# Patient Record
Sex: FEMALE | Race: BLACK OR AFRICAN AMERICAN | NOT HISPANIC OR LATINO | Employment: FULL TIME | URBAN - NONMETROPOLITAN AREA
[De-identification: names, ages, dates, MRNs, and addresses within clinical notes are randomized per-mention and may not be internally consistent; named-entity substitution may affect disease eponyms.]

---

## 2022-12-21 ENCOUNTER — OFFICE VISIT (OUTPATIENT)
Dept: FAMILY MEDICINE CLINIC | Facility: CLINIC | Age: 37
End: 2022-12-21

## 2022-12-21 VITALS
DIASTOLIC BLOOD PRESSURE: 98 MMHG | HEART RATE: 73 BPM | OXYGEN SATURATION: 99 % | WEIGHT: 193.1 LBS | BODY MASS INDEX: 32.97 KG/M2 | SYSTOLIC BLOOD PRESSURE: 134 MMHG | TEMPERATURE: 98.2 F | HEIGHT: 64 IN

## 2022-12-21 DIAGNOSIS — Z00.00 ENCOUNTER FOR MEDICAL EXAMINATION TO ESTABLISH CARE: Primary | ICD-10-CM

## 2022-12-21 PROCEDURE — 99385 PREV VISIT NEW AGE 18-39: CPT | Performed by: NURSE PRACTITIONER

## 2022-12-21 RX ORDER — FLUTICASONE PROPIONATE 50 MCG
SPRAY, SUSPENSION (ML) NASAL
COMMUNITY

## 2022-12-21 RX ORDER — DROSPIRENONE 4 MG/1
TABLET, FILM COATED ORAL
COMMUNITY
Start: 2022-10-17

## 2022-12-21 RX ORDER — MONTELUKAST SODIUM 10 MG/1
TABLET ORAL
COMMUNITY

## 2022-12-21 RX ORDER — CETIRIZINE HYDROCHLORIDE 10 MG/1
TABLET ORAL
COMMUNITY

## 2022-12-21 RX ORDER — MULTIPLE VITAMINS W/ MINERALS TAB 9MG-400MCG
1 TAB ORAL DAILY
COMMUNITY

## 2022-12-21 RX ORDER — MOMETASONE FUROATE AND FORMOTEROL FUMARATE DIHYDRATE 200; 5 UG/1; UG/1
AEROSOL RESPIRATORY (INHALATION)
COMMUNITY

## 2022-12-21 NOTE — PROGRESS NOTES
"Chief Complaint  Asthma and Establish Care    Subjective        Jf Reyes presents to [facility]  History of Present Illness  She has had bw done in May that was relatively normal. She lives here local. She is currently being treated for asthma. She states that this is in good control. Her cholesterol panel and CMP were normal.     Objective   Vital Signs:  /98 (BP Location: Left arm, Patient Position: Sitting, Cuff Size: Adult)   Pulse 73   Temp 98.2 °F (36.8 °C) (Temporal)   Ht 162.6 cm (64\")   Wt 87.6 kg (193 lb 1.6 oz)   SpO2 99%   BMI 33.15 kg/m²   Estimated body mass index is 33.15 kg/m² as calculated from the following:    Height as of this encounter: 162.6 cm (64\").    Weight as of this encounter: 87.6 kg (193 lb 1.6 oz).          Physical Exam  Vitals and nursing note reviewed.   Constitutional:       Appearance: Normal appearance.   HENT:      Head: Normocephalic and atraumatic.      Right Ear: Tympanic membrane and ear canal normal.      Left Ear: Tympanic membrane and ear canal normal.      Nose: Nose normal.      Mouth/Throat:      Pharynx: Oropharynx is clear.   Eyes:      Extraocular Movements: Extraocular movements intact.      Conjunctiva/sclera: Conjunctivae normal.      Pupils: Pupils are equal, round, and reactive to light.   Cardiovascular:      Rate and Rhythm: Normal rate and regular rhythm.      Heart sounds: Normal heart sounds.   Pulmonary:      Effort: Pulmonary effort is normal.      Breath sounds: Normal breath sounds.   Abdominal:      General: Abdomen is flat. Bowel sounds are normal. There is no distension.      Palpations: Abdomen is soft.      Tenderness: There is no abdominal tenderness. There is no guarding or rebound.   Musculoskeletal:         General: Normal range of motion.      Cervical back: Normal range of motion and neck supple.   Skin:     General: Skin is warm and dry.   Neurological:      General: No focal deficit present. "      Mental Status: She is alert and oriented to person, place, and time. Mental status is at baseline.   Psychiatric:         Mood and Affect: Mood normal.         Behavior: Behavior normal.         Thought Content: Thought content normal.         Judgment: Judgment normal.        Result Review :      Data reviewed: Reviewed her bw on her mychart from another clinic.          Assessment and Plan   Diagnoses and all orders for this visit:    1. Encounter for medical examination to establish care (Primary)  Assessment & Plan:  Follow up as needed.             Follow Up   Return if symptoms worsen or fail to improve.  Patient was given instructions and counseling regarding her condition or for health maintenance advice. Please see specific information pulled into the AVS if appropriate.

## 2023-05-24 ENCOUNTER — OFFICE VISIT (OUTPATIENT)
Dept: FAMILY MEDICINE CLINIC | Facility: CLINIC | Age: 38
End: 2023-05-24
Payer: COMMERCIAL

## 2023-05-24 VITALS
DIASTOLIC BLOOD PRESSURE: 98 MMHG | OXYGEN SATURATION: 98 % | HEIGHT: 64 IN | BODY MASS INDEX: 32.88 KG/M2 | HEART RATE: 64 BPM | SYSTOLIC BLOOD PRESSURE: 148 MMHG | WEIGHT: 192.6 LBS

## 2023-05-24 DIAGNOSIS — Z00.00 ENCOUNTER FOR MEDICAL EXAMINATION TO ESTABLISH CARE: Primary | ICD-10-CM

## 2023-05-24 DIAGNOSIS — J30.2 SEASONAL ALLERGIES: ICD-10-CM

## 2023-05-24 PROCEDURE — 99395 PREV VISIT EST AGE 18-39: CPT | Performed by: NURSE PRACTITIONER

## 2023-05-24 PROCEDURE — 36415 COLL VENOUS BLD VENIPUNCTURE: CPT | Performed by: NURSE PRACTITIONER

## 2023-05-24 RX ORDER — ALBUTEROL SULFATE 90 UG/1
AEROSOL, METERED RESPIRATORY (INHALATION)
COMMUNITY

## 2023-05-24 RX ORDER — DROSPIRENONE 4 MG/1
TABLET, FILM COATED ORAL EVERY 24 HOURS
COMMUNITY
End: 2023-05-24

## 2023-05-24 RX ORDER — MONTELUKAST SODIUM 10 MG/1
10 TABLET ORAL NIGHTLY
Qty: 90 TABLET | Refills: 1 | Status: SHIPPED | OUTPATIENT
Start: 2023-05-24

## 2023-05-24 RX ORDER — MOMETASONE FUROATE AND FORMOTEROL FUMARATE DIHYDRATE 200; 5 UG/1; UG/1
2 AEROSOL RESPIRATORY (INHALATION)
Qty: 1 EACH | Refills: 5 | Status: SHIPPED | OUTPATIENT
Start: 2023-05-24

## 2023-05-24 RX ORDER — ALBUTEROL SULFATE 2.5 MG/3ML
SOLUTION RESPIRATORY (INHALATION)
COMMUNITY

## 2023-05-24 NOTE — PROGRESS NOTES
"Chief Complaint  Annual Exam    Subjective        Jf Reyes presents to [facility]  History of Present Illness She is here to day for a physical. She sees GYN for her female issues. She does MSBE. She has breast cancer on her maternal side. She has not had a screening mammogram. She has normal bowels and bladder.     Objective   Vital Signs:  /98 (BP Location: Right arm, Patient Position: Sitting, Cuff Size: Adult)   Pulse 64   Ht 162.6 cm (64\")   Wt 87.4 kg (192 lb 9.6 oz)   SpO2 98%   BMI 33.06 kg/m²   Estimated body mass index is 33.06 kg/m² as calculated from the following:    Height as of this encounter: 162.6 cm (64\").    Weight as of this encounter: 87.4 kg (192 lb 9.6 oz).             Physical Exam  Vitals and nursing note reviewed.   Constitutional:       Appearance: Normal appearance.   HENT:      Head: Normocephalic and atraumatic.      Right Ear: Tympanic membrane and ear canal normal.      Left Ear: Tympanic membrane and ear canal normal.      Nose: Nose normal.      Mouth/Throat:      Pharynx: Oropharynx is clear.   Eyes:      Extraocular Movements: Extraocular movements intact.      Conjunctiva/sclera: Conjunctivae normal.      Pupils: Pupils are equal, round, and reactive to light.   Cardiovascular:      Rate and Rhythm: Normal rate and regular rhythm.      Heart sounds: Normal heart sounds.   Pulmonary:      Effort: Pulmonary effort is normal.      Breath sounds: Normal breath sounds.   Abdominal:      General: Abdomen is flat. Bowel sounds are normal. There is no distension.      Palpations: Abdomen is soft.      Tenderness: There is no abdominal tenderness. There is no guarding or rebound.   Musculoskeletal:         General: Normal range of motion.      Cervical back: Normal range of motion and neck supple.   Skin:     General: Skin is warm and dry.   Neurological:      General: No focal deficit present.      Mental Status: She is alert and oriented to " person, place, and time. Mental status is at baseline.   Psychiatric:         Mood and Affect: Mood normal.         Behavior: Behavior normal.         Thought Content: Thought content normal.         Judgment: Judgment normal.        Result Review :                   Assessment and Plan   Diagnoses and all orders for this visit:    1. Encounter for medical examination to establish care (Primary)  Assessment & Plan:  She is doing msbe and watching her diet.     Orders:  -     Mammo Screening Bilateral With CAD; Future  -     CBC (No Diff); Future  -     Comprehensive metabolic panel; Future  -     Lipid panel; Future  -     TSH; Future  -     Hemoglobin A1c; Future    2. Seasonal allergies  Assessment & Plan:  Refill her medications.     Orders:  -     mometasone-formoterol (Dulera) 200-5 MCG/ACT inhaler; Inhale 2 puffs 2 (Two) Times a Day.  Dispense: 1 each; Refill: 5  -     montelukast (SINGULAIR) 10 MG tablet; Take 1 tablet by mouth Every Night.  Dispense: 90 tablet; Refill: 1           Follow Up   Return in about 1 year (around 5/24/2024) for Annual physical.  Patient was given instructions and counseling regarding her condition or for health maintenance advice. Please see specific information pulled into the AVS if appropriate.

## 2023-05-25 LAB
ALBUMIN SERPL-MCNC: 4.6 G/DL (ref 3.8–4.8)
ALBUMIN/GLOB SERPL: 1.5 {RATIO} (ref 1.2–2.2)
ALP SERPL-CCNC: 88 IU/L (ref 44–121)
ALT SERPL-CCNC: 12 IU/L (ref 0–32)
AST SERPL-CCNC: 14 IU/L (ref 0–40)
BILIRUB SERPL-MCNC: 0.4 MG/DL (ref 0–1.2)
BUN SERPL-MCNC: 6 MG/DL (ref 6–20)
BUN/CREAT SERPL: 9 (ref 9–23)
CALCIUM SERPL-MCNC: 9.4 MG/DL (ref 8.7–10.2)
CHLORIDE SERPL-SCNC: 102 MMOL/L (ref 96–106)
CHOLEST SERPL-MCNC: 187 MG/DL (ref 100–199)
CO2 SERPL-SCNC: 22 MMOL/L (ref 20–29)
CREAT SERPL-MCNC: 0.67 MG/DL (ref 0.57–1)
EGFRCR SERPLBLD CKD-EPI 2021: 116 ML/MIN/1.73
ERYTHROCYTE [DISTWIDTH] IN BLOOD BY AUTOMATED COUNT: 12.3 % (ref 11.7–15.4)
GLOBULIN SER CALC-MCNC: 3 G/DL (ref 1.5–4.5)
GLUCOSE SERPL-MCNC: 78 MG/DL (ref 70–99)
HBA1C MFR BLD: 5.3 % (ref 4.8–5.6)
HCT VFR BLD AUTO: 40.4 % (ref 34–46.6)
HDLC SERPL-MCNC: 59 MG/DL
HGB BLD-MCNC: 13.5 G/DL (ref 11.1–15.9)
LDLC SERPL CALC-MCNC: 115 MG/DL (ref 0–99)
MCH RBC QN AUTO: 30.1 PG (ref 26.6–33)
MCHC RBC AUTO-ENTMCNC: 33.4 G/DL (ref 31.5–35.7)
MCV RBC AUTO: 90 FL (ref 79–97)
PLATELET # BLD AUTO: 296 X10E3/UL (ref 150–450)
POTASSIUM SERPL-SCNC: 4.1 MMOL/L (ref 3.5–5.2)
PROT SERPL-MCNC: 7.6 G/DL (ref 6–8.5)
RBC # BLD AUTO: 4.49 X10E6/UL (ref 3.77–5.28)
SODIUM SERPL-SCNC: 141 MMOL/L (ref 134–144)
TRIGL SERPL-MCNC: 70 MG/DL (ref 0–149)
TSH SERPL DL<=0.005 MIU/L-ACNC: 3.12 UIU/ML (ref 0.45–4.5)
VLDLC SERPL CALC-MCNC: 13 MG/DL (ref 5–40)
WBC # BLD AUTO: 6.4 X10E3/UL (ref 3.4–10.8)

## 2023-11-27 DIAGNOSIS — J30.2 SEASONAL ALLERGIES: ICD-10-CM

## 2023-11-27 RX ORDER — MONTELUKAST SODIUM 10 MG/1
10 TABLET ORAL
Qty: 90 TABLET | Refills: 0 | Status: SHIPPED | OUTPATIENT
Start: 2023-11-27

## 2024-01-09 ENCOUNTER — OFFICE VISIT (OUTPATIENT)
Dept: FAMILY MEDICINE CLINIC | Facility: CLINIC | Age: 39
End: 2024-01-09
Payer: COMMERCIAL

## 2024-01-09 VITALS
OXYGEN SATURATION: 100 % | TEMPERATURE: 98.1 F | DIASTOLIC BLOOD PRESSURE: 90 MMHG | HEART RATE: 69 BPM | HEIGHT: 64 IN | WEIGHT: 196.4 LBS | BODY MASS INDEX: 33.53 KG/M2 | SYSTOLIC BLOOD PRESSURE: 130 MMHG

## 2024-01-09 DIAGNOSIS — R59.9 LYMPH NODE ENLARGEMENT: Primary | ICD-10-CM

## 2024-01-09 DIAGNOSIS — J30.2 SEASONAL ALLERGIES: ICD-10-CM

## 2024-01-09 NOTE — ASSESSMENT & PLAN NOTE
Patient complains of sinus congestion for the past couple of weeks.  Patient has been taking decongestants including Zyrtec  Encourage patient to continue with Zyrtec and decongestants and cautioned her to make sure she is drinking enough fluids during the day.  Patient verbalizes understanding  No change in treatment plan today

## 2024-01-09 NOTE — PATIENT INSTRUCTIONS
Daily Claritin/Zyrtec for congestion  Re-check in 2 weeks - evaluate lymph node and review lab results      Health Maintenance, Female  Adopting a healthy lifestyle and getting preventive care can go a long way to promote health and wellness. Talk with your health care provider about what schedule of regular examinations is right for you. This is a good chance for you to check in with your provider about disease prevention and staying healthy.  In between checkups, there are plenty of things you can do on your own. Experts have done a lot of research about which lifestyle changes and preventive measures are most likely to keep you healthy. Ask your health care provider for more information.  Weight and diet  Eat a healthy diet  Be sure to include plenty of vegetables, fruits, low-fat dairy products, and lean protein.  Do not eat a lot of foods high in solid fats, added sugars, or salt.  Get regular exercise. This is one of the most important things you can do for your health.  Most adults should exercise for at least 150 minutes each week. The exercise should increase your heart rate and make you sweat (moderate-intensity exercise).  Most adults should also do strengthening exercises at least twice a week. This is in addition to the moderate-intensity exercise.     Maintain a healthy weight  Body mass index (BMI) is a measurement that can be used to identify possible weight problems. It estimates body fat based on height and weight. Your health care provider can help determine your BMI and help you achieve or maintain a healthy weight.  For females 20 years of age and older:  A BMI below 18.5 is considered underweight.  A BMI of 18.5 to 24.9 is normal.  A BMI of 25 to 29.9 is considered overweight.  A BMI of 30 and above is considered obese.     Watch levels of cholesterol and blood lipids  You should start having your blood tested for lipids and cholesterol at 20 years of age, then have this test every 5  years.  You may need to have your cholesterol levels checked more often if:  Your lipid or cholesterol levels are high.  You are older than 50 years of age.  You are at high risk for heart disease.     Cancer screening  Lung Cancer  Lung cancer screening is recommended for adults 55-80 years old who are at high risk for lung cancer because of a history of smoking.  A yearly low-dose CT scan of the lungs is recommended for people who:  Currently smoke.  Have quit within the past 15 years.  Have at least a 30-pack-year history of smoking. A pack year is smoking an average of one pack of cigarettes a day for 1 year.  Yearly screening should continue until it has been 15 years since you quit.  Yearly screening should stop if you develop a health problem that would prevent you from having lung cancer treatment.     Breast Cancer  Practice breast self-awareness. This means understanding how your breasts normally appear and feel.  It also means doing regular breast self-exams. Let your health care provider know about any changes, no matter how small.  If you are in your 20s or 30s, you should have a clinical breast exam (CBE) by a health care provider every 1-3 years as part of a regular health exam.  If you are 40 or older, have a CBE every year. Also consider having a breast X-ray (mammogram) every year.  If you have a family history of breast cancer, talk to your health care provider about genetic screening.  If you are at high risk for breast cancer, talk to your health care provider about having an MRI and a mammogram every year.  Breast cancer gene (BRCA) assessment is recommended for women who have family members with BRCA-related cancers. BRCA-related cancers include:  Breast.  Ovarian.  Tubal.  Peritoneal cancers.  Results of the assessment will determine the need for genetic counseling and BRCA1 and BRCA2 testing.     Cervical Cancer  Your health care provider may recommend that you be screened regularly for  cancer of the pelvic organs (ovaries, uterus, and vagina). This screening involves a pelvic examination, including checking for microscopic changes to the surface of your cervix (Pap test). You may be encouraged to have this screening done every 3 years, beginning at age 21.  For women ages 30-65, health care providers may recommend pelvic exams and Pap testing every 3 years, or they may recommend the Pap and pelvic exam, combined with testing for human papilloma virus (HPV), every 5 years. Some types of HPV increase your risk of cervical cancer. Testing for HPV may also be done on women of any age with unclear Pap test results.  Other health care providers may not recommend any screening for nonpregnant women who are considered low risk for pelvic cancer and who do not have symptoms. Ask your health care provider if a screening pelvic exam is right for you.  If you have had past treatment for cervical cancer or a condition that could lead to cancer, you need Pap tests and screening for cancer for at least 20 years after your treatment. If Pap tests have been discontinued, your risk factors (such as having a new sexual partner) need to be reassessed to determine if screening should resume. Some women have medical problems that increase the chance of getting cervical cancer. In these cases, your health care provider may recommend more frequent screening and Pap tests.     Colorectal Cancer  This type of cancer can be detected and often prevented.  Routine colorectal cancer screening usually begins at 50 years of age and continues through 75 years of age.  Your health care provider may recommend screening at an earlier age if you have risk factors for colon cancer.  Your health care provider may also recommend using home test kits to check for hidden blood in the stool.  A small camera at the end of a tube can be used to examine your colon directly (sigmoidoscopy or colonoscopy). This is done to check for the earliest  forms of colorectal cancer.  Routine screening usually begins at age 50.  Direct examination of the colon should be repeated every 5-10 years through 75 years of age. However, you may need to be screened more often if early forms of precancerous polyps or small growths are found.     Skin Cancer  Check your skin from head to toe regularly.  Tell your health care provider about any new moles or changes in moles, especially if there is a change in a mole's shape or color.  Also tell your health care provider if you have a mole that is larger than the size of a pencil eraser.  Always use sunscreen. Apply sunscreen liberally and repeatedly throughout the day.  Protect yourself by wearing long sleeves, pants, a wide-brimmed hat, and sunglasses whenever you are outside.     Heart disease, diabetes, and high blood pressure  High blood pressure causes heart disease and increases the risk of stroke. High blood pressure is more likely to develop in:  People who have blood pressure in the high end of the normal range (130-139/85-89 mm Hg).  People who are overweight or obese.  People who are .  If you are 18-39 years of age, have your blood pressure checked every 3-5 years. If you are 40 years of age or older, have your blood pressure checked every year. You should have your blood pressure measured twice--once when you are at a hospital or clinic, and once when you are not at a hospital or clinic. Record the average of the two measurements. To check your blood pressure when you are not at a hospital or clinic, you can use:  An automated blood pressure machine at a pharmacy.  A home blood pressure monitor.  If you are between 55 years and 79 years old, ask your health care provider if you should take aspirin to prevent strokes.  Have regular diabetes screenings. This involves taking a blood sample to check your fasting blood sugar level.  If you are at a normal weight and have a low risk for diabetes, have this  test once every three years after 45 years of age.  If you are overweight and have a high risk for diabetes, consider being tested at a younger age or more often.  Preventing infection  Hepatitis B  If you have a higher risk for hepatitis B, you should be screened for this virus. You are considered at high risk for hepatitis B if:  You were born in a country where hepatitis B is common. Ask your health care provider which countries are considered high risk.  Your parents were born in a high-risk country, and you have not been immunized against hepatitis B (hepatitis B vaccine).  You have HIV or AIDS.  You use needles to inject street drugs.  You live with someone who has hepatitis B.  You have had sex with someone who has hepatitis B.  You get hemodialysis treatment.  You take certain medicines for conditions, including cancer, organ transplantation, and autoimmune conditions.     Hepatitis C  Blood testing is recommended for:  Everyone born from 1945 through 1965.  Anyone with known risk factors for hepatitis C.     Sexually transmitted infections (STIs)  You should be screened for sexually transmitted infections (STIs) including gonorrhea and chlamydia if:  You are sexually active and are younger than 24 years of age.  You are older than 24 years of age and your health care provider tells you that you are at risk for this type of infection.  Your sexual activity has changed since you were last screened and you are at an increased risk for chlamydia or gonorrhea. Ask your health care provider if you are at risk.  If you do not have HIV, but are at risk, it may be recommended that you take a prescription medicine daily to prevent HIV infection. This is called pre-exposure prophylaxis (PrEP). You are considered at risk if:  You are sexually active and do not regularly use condoms or know the HIV status of your partner(s).  You take drugs by injection.  You are sexually active with a partner who has HIV.     Talk with  your health care provider about whether you are at high risk of being infected with HIV. If you choose to begin PrEP, you should first be tested for HIV. You should then be tested every 3 months for as long as you are taking PrEP.  Pregnancy  If you are premenopausal and you may become pregnant, ask your health care provider about preconception counseling.  If you may become pregnant, take 400 to 800 micrograms (mcg) of folic acid every day.  If you want to prevent pregnancy, talk to your health care provider about birth control (contraception).  Osteoporosis and menopause  Osteoporosis is a disease in which the bones lose minerals and strength with aging. This can result in serious bone fractures. Your risk for osteoporosis can be identified using a bone density scan.  If you are 65 years of age or older, or if you are at risk for osteoporosis and fractures, ask your health care provider if you should be screened.  Ask your health care provider whether you should take a calcium or vitamin D supplement to lower your risk for osteoporosis.  Menopause may have certain physical symptoms and risks.  Hormone replacement therapy may reduce some of these symptoms and risks.  Talk to your health care provider about whether hormone replacement therapy is right for you.  Follow these instructions at home:  Schedule regular health, dental, and eye exams.  Stay current with your immunizations.  Do not use any tobacco products including cigarettes, chewing tobacco, or electronic cigarettes.  If you are pregnant, do not drink alcohol.  If you are breastfeeding, limit how much and how often you drink alcohol.  Limit alcohol intake to no more than 1 drink per day for nonpregnant women. One drink equals 12 ounces of beer, 5 ounces of wine, or 1½ ounces of hard liquor.  Do not use street drugs.  Do not share needles.  Ask your health care provider for help if you need support or information about quitting drugs.  Tell your health  care provider if you often feel depressed.  Tell your health care provider if you have ever been abused or do not feel safe at home.  This information is not intended to replace advice given to you by your health care provider. Make sure you discuss any questions you have with your health care provider.  Document Released: 07/02/2012 Document Revised: 05/25/2017 Document Reviewed: 09/20/2016  [employer] Interactive Patient Education © 2018 [employer]

## 2024-01-09 NOTE — ASSESSMENT & PLAN NOTE
Enlargement of single axillary lymph node, less than 1 cm, nontender, mobile and soft.  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  Patient states lymph node is decreasing in size since she first noticed it.  CBC and CMP collected today and patient agrees to recheck in 2 weeks or return sooner if new or worsening symptoms arise.  Patient education materials attached to AVS. Materials were reviewed with patient during their office visit today and all questions addressed.

## 2024-01-09 NOTE — PROGRESS NOTES
Acute Office Visit      Date: 2024  Patient Name: Jf Reyes  : 1985   MRN: [id number]     Chief Complaint:    Chief Complaint   Patient presents with    Adenopathy       History of Present Illness: Jf Reyes is a 38 y.o. female who is here today for concerns about lymph node swelling in right axillary.    Answers submitted by the patient for this visit:  Office Visit on 2024  8:30 AM with Deena Valle (Submitted on 2024)  Please describe your symptoms.: Right axillary lymph node slightly enlarged  Have you had these symptoms before?: No  How long have you been having these symptoms?: 5-7 days  Please describe any probable cause for these symptoms. 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  Mammogram ordered by another provider at previous visit in May-but because patient is not yet 41 years old this was denied by insurance  Maternal grandmother - breast CA -no other breast cancer history  No known injury or illness, no fevers - recent URI resolving, it began 2 weeks ago, still has some residual congestion   No other constitutional symptoms  No unexpected weight loss - urine and stools normal     Subjective     Medications:     Current Outpatient Medications:     albuterol (PROVENTIL) (2.5 MG/3ML) 0.083% nebulizer solution, albuterol sulfate 2.5 mg/3 mL (0.083 %) solution for nebulization  USE 3 ML VIA NEBULIZER THREE TIMES DAILY, Disp: , Rfl:     albuterol sulfate  (90 Base) MCG/ACT inhaler, albuterol sulfate HFA 90 mcg/actuation aerosol inhaler  INHALE 1 TO 2 PUFFS EVERY 4 TO 6 HOURS AS NEEDED FOR WHEEZE, Disp: , Rfl:     CBD oil (cannabidiol) capsule, Take  by mouth., Disp: , Rfl:     cetirizine (zyrTEC) 10 MG tablet, , Disp: , Rfl:     fluticasone (FLONASE) 50 MCG/ACT nasal spray, , Disp: , Rfl:      "mometasone-formoterol (Dulera) 200-5 MCG/ACT inhaler, Inhale 2 puffs 2 (Two) Times a Day. (Patient taking differently: Inhale 1 puff Daily.), Disp: 1 each, Rfl: 5    montelukast (SINGULAIR) 10 MG tablet, TAKE 1 TABLET BY MOUTH EVERY DAY AT NIGHT, Disp: 90 tablet, Rfl: 0    multivitamin with minerals tablet tablet, Take 1 tablet by mouth Daily., Disp: , Rfl:     Allergies:   Allergies   Allergen Reactions    Ibuprofen Swelling       Objective     Vitals:    01/09/24 0812   BP: 130/90   BP Location: Right arm   Patient Position: Sitting   Cuff Size: Large Adult   Pulse: 69   Temp: 98.1 °F (36.7 °C)   TempSrc: Oral   SpO2: 100%   Weight: 89.1 kg (196 lb 6.4 oz)   Height: 162.6 cm (64\")   PainSc: 0-No pain       Body mass index is 33.71 kg/m².     Physical Exam  Vitals and nursing note reviewed.   Constitutional:       General: She is not in acute distress.     Appearance: Normal appearance. She is not ill-appearing.   HENT:      Head: Normocephalic and atraumatic.      Right Ear: Hearing, ear canal and external ear normal. Tympanic membrane is bulging. Tympanic membrane is not erythematous.      Left Ear: Hearing, ear canal and external ear normal. Tympanic membrane is bulging. Tympanic membrane is not erythematous.      Nose: Congestion and rhinorrhea present.      Mouth/Throat:      Pharynx: Posterior oropharyngeal erythema present. No oropharyngeal exudate.   Eyes:      General: Lids are normal.      Conjunctiva/sclera: Conjunctivae normal.      Pupils: Pupils are equal, round, and reactive to light.   Neck:      Thyroid: No thyroid mass, thyromegaly or thyroid tenderness.      Trachea: Trachea and phonation normal.   Cardiovascular:      Rate and Rhythm: Normal rate and regular rhythm.   Pulmonary:      Effort: Pulmonary effort is normal. No respiratory distress.      Breath sounds: Normal breath sounds.   Musculoskeletal:      Cervical back: Normal range of motion and neck supple. No edema or rigidity. "   Lymphadenopathy:      Head:      Right side of head: No submental, submandibular, tonsillar, preauricular or posterior auricular adenopathy.      Left side of head: No submental, submandibular, tonsillar, preauricular or posterior auricular adenopathy.      Cervical: No cervical adenopathy.      Right cervical: No superficial, deep or posterior cervical adenopathy.     Left cervical: No superficial, deep or posterior cervical adenopathy.      Upper Body:      Right upper body: Axillary adenopathy (Solitary lymph node swelling, right axilla.  Nontender, non-erythematous, soft and mobile) present. No supraclavicular adenopathy.      Left upper body: No supraclavicular or axillary adenopathy.   Skin:     General: Skin is warm and dry.   Neurological:      Mental Status: She is alert and oriented to person, place, and time.   Psychiatric:         Mood and Affect: Mood normal.         Behavior: Behavior normal.         Results for orders placed or performed in visit on 05/24/23   TSH    Blood   Result Value Ref Range    TSH 3.120 0.450 - 4.500 uIU/mL   Comprehensive Metabolic Panel    Blood   Result Value Ref Range    Glucose 78 70 - 99 mg/dL    BUN 6 6 - 20 mg/dL    Creatinine 0.67 0.57 - 1.00 mg/dL    EGFR Result 116 >59 mL/min/1.73    BUN/Creatinine Ratio 9 9 - 23    Sodium 141 134 - 144 mmol/L    Potassium 4.1 3.5 - 5.2 mmol/L    Chloride 102 96 - 106 mmol/L    Total CO2 22 20 - 29 mmol/L    Calcium 9.4 8.7 - 10.2 mg/dL    Total Protein 7.6 6.0 - 8.5 g/dL    Albumin 4.6 3.8 - 4.8 g/dL    Globulin 3.0 1.5 - 4.5 g/dL    A/G Ratio 1.5 1.2 - 2.2    Total Bilirubin 0.4 0.0 - 1.2 mg/dL    Alkaline Phosphatase 88 44 - 121 IU/L    AST (SGOT) 14 0 - 40 IU/L    ALT (SGPT) 12 0 - 32 IU/L   Lipid Panel    Blood   Result Value Ref Range    Total Cholesterol 187 100 - 199 mg/dL    Triglycerides 70 0 - 149 mg/dL    HDL Cholesterol 59 >39 mg/dL    VLDL Cholesterol Patrick 13 5 - 40 mg/dL    LDL Chol Calc (NIH) 115 (H) 0 - 99 mg/dL    CBC (No Diff)    Blood   Result Value Ref Range    WBC 6.4 3.4 - 10.8 x10E3/uL    RBC 4.49 3.77 - 5.28 x10E6/uL    Hemoglobin 13.5 11.1 - 15.9 g/dL    Hematocrit 40.4 34.0 - 46.6 %    MCV 90 79 - 97 fL    MCH 30.1 26.6 - 33.0 pg    MCHC 33.4 31.5 - 35.7 g/dL    RDW 12.3 11.7 - 15.4 %    Platelets 296 150 - 450 x10E3/uL   Hemoglobin A1c    Blood   Result Value Ref Range    Hemoglobin A1C 5.3 4.8 - 5.6 %             Assessment / Plan      Assessment/Plan:   Diagnoses and all orders for this visit:    1. Lymph node enlargement (Primary)  Assessment & Plan:  Enlargement of single axillary lymph node, less than 1 cm, nontender, mobile and soft.  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  Patient states lymph node is decreasing in size since she first noticed it.  CBC and CMP collected today and patient agrees to recheck in 2 weeks or return sooner if new or worsening symptoms arise.  Patient education materials attached to AVS. Materials were reviewed with patient during their office visit today and all questions addressed.    Orders:  -     CBC Auto Differential  -     Comprehensive Metabolic Panel    2. Seasonal allergies  Assessment & Plan:  Patient complains of sinus congestion for the past couple of weeks.  Patient has been taking decongestants including Zyrtec  Encourage patient to continue with Zyrtec and decongestants and cautioned her to make sure she is drinking enough fluids during the day.  Patient verbalizes understanding  No change in treatment plan today      Follow Up:   Return in about 2 weeks (around 1/23/2024) for Recheck.    BILL Bustos (Libby)  [facility] [address] [address]

## 2024-01-10 LAB
ALBUMIN SERPL-MCNC: 4.6 G/DL (ref 3.9–4.9)
ALBUMIN/GLOB SERPL: 1.6 {RATIO} (ref 1.2–2.2)
ALP SERPL-CCNC: 84 IU/L (ref 44–121)
ALT SERPL-CCNC: 12 IU/L (ref 0–32)
AST SERPL-CCNC: 17 IU/L (ref 0–40)
BASOPHILS # BLD AUTO: 0 X10E3/UL (ref 0–0.2)
BASOPHILS NFR BLD AUTO: 0 %
BILIRUB SERPL-MCNC: 0.3 MG/DL (ref 0–1.2)
BUN SERPL-MCNC: 9 MG/DL (ref 6–20)
BUN/CREAT SERPL: 12 (ref 9–23)
CALCIUM SERPL-MCNC: 9.4 MG/DL (ref 8.7–10.2)
CHLORIDE SERPL-SCNC: 103 MMOL/L (ref 96–106)
CO2 SERPL-SCNC: 22 MMOL/L (ref 20–29)
CREAT SERPL-MCNC: 0.77 MG/DL (ref 0.57–1)
EGFRCR SERPLBLD CKD-EPI 2021: 102 ML/MIN/1.73
EOSINOPHIL # BLD AUTO: 0.1 X10E3/UL (ref 0–0.4)
EOSINOPHIL NFR BLD AUTO: 1 %
ERYTHROCYTE [DISTWIDTH] IN BLOOD BY AUTOMATED COUNT: 12 % (ref 11.7–15.4)
GLOBULIN SER CALC-MCNC: 2.8 G/DL (ref 1.5–4.5)
GLUCOSE SERPL-MCNC: 85 MG/DL (ref 70–99)
HCT VFR BLD AUTO: 38.8 % (ref 34–46.6)
HGB BLD-MCNC: 13.1 G/DL (ref 11.1–15.9)
IMM GRANULOCYTES # BLD AUTO: 0 X10E3/UL (ref 0–0.1)
IMM GRANULOCYTES NFR BLD AUTO: 0 %
LYMPHOCYTES # BLD AUTO: 1.8 X10E3/UL (ref 0.7–3.1)
LYMPHOCYTES NFR BLD AUTO: 23 %
MCH RBC QN AUTO: 30.3 PG (ref 26.6–33)
MCHC RBC AUTO-ENTMCNC: 33.8 G/DL (ref 31.5–35.7)
MCV RBC AUTO: 90 FL (ref 79–97)
MONOCYTES # BLD AUTO: 0.6 X10E3/UL (ref 0.1–0.9)
MONOCYTES NFR BLD AUTO: 7 %
NEUTROPHILS # BLD AUTO: 5.2 X10E3/UL (ref 1.4–7)
NEUTROPHILS NFR BLD AUTO: 69 %
PLATELET # BLD AUTO: 314 X10E3/UL (ref 150–450)
POTASSIUM SERPL-SCNC: 4.1 MMOL/L (ref 3.5–5.2)
PROT SERPL-MCNC: 7.4 G/DL (ref 6–8.5)
RBC # BLD AUTO: 4.32 X10E6/UL (ref 3.77–5.28)
SODIUM SERPL-SCNC: 140 MMOL/L (ref 134–144)
WBC # BLD AUTO: 7.7 X10E3/UL (ref 3.4–10.8)

## 2024-01-23 ENCOUNTER — OFFICE VISIT (OUTPATIENT)
Dept: FAMILY MEDICINE CLINIC | Facility: CLINIC | Age: 39
End: 2024-01-23
Payer: COMMERCIAL

## 2024-01-23 VITALS
WEIGHT: 194.3 LBS | BODY MASS INDEX: 33.17 KG/M2 | DIASTOLIC BLOOD PRESSURE: 98 MMHG | HEART RATE: 63 BPM | SYSTOLIC BLOOD PRESSURE: 130 MMHG | HEIGHT: 64 IN | OXYGEN SATURATION: 98 %

## 2024-01-23 DIAGNOSIS — R59.9 LYMPH NODE ENLARGEMENT: ICD-10-CM

## 2024-01-23 DIAGNOSIS — J30.2 SEASONAL ALLERGIES: Primary | ICD-10-CM

## 2024-01-23 DIAGNOSIS — R03.0 ELEVATED BLOOD-PRESSURE READING WITHOUT DIAGNOSIS OF HYPERTENSION: ICD-10-CM

## 2024-01-23 PROCEDURE — 99213 OFFICE O/P EST LOW 20 MIN: CPT

## 2024-01-23 NOTE — ASSESSMENT & PLAN NOTE
Elevated blood pressures over the last several visits-patient did mention she has been taking Sudafed regularly  Patient to stop Sudafed and track her blood pressures over the next several weeks and follow-up for evaluation to determine if pharmacological therapy is indicated   Patient education materials attached to AVS. Materials were reviewed with patient during her office visit today and all questions addressed.

## 2024-01-23 NOTE — ASSESSMENT & PLAN NOTE
Right lymph node swelling almost completely resolved  Patient has no other concerns at this time  Will evaluate at routine follow-up visits and if swelling returns we will consider ultrasound at that time  Patient tells me she was denied screening mammogram previously due to her age, but patient concerned about breast cancer related to maternal grandmother having breast cancer.  May consider BRCA testing.  Will discuss at her follow-up visit in 4 weeks.

## 2024-01-23 NOTE — PROGRESS NOTES
"     Follow Up Office Visit      Date:  2024   Patient Name: Jf Reyes  : 1985   MRN: [id number]     Chief Complaint:    Chief Complaint   Patient presents with    Follow-up       History of Present Illness: Jf Reyes is a 38 y.o. female who is here for follow-up related to enlarged lymph node in right axilla.      HPI Notes:  Swelling in lymph node almost completely resolved, patient denies any other symptoms  Patient curious if could be related to right shoulder dislocation - reduced in ER (in her 20's)   Feels like tissue is more dense in that area  Trans-vag US 2 yrs ago - periods \"not normal\" - nothing abnormal  [facility]ing with [facility] (needs to est here)  BRCA testing? - pt maternal GM breast CA - unknown if she passed from that  Patient has had tubal ligation -recently treated with progesterone when she began having 2 periods a month  Patient reports her periods are now regular-denies ever having abnormal Pap      Subjective      Past Medical History:   Past Medical History:   Diagnosis Date    Allergic     Asthma        Past Surgical History:   Past Surgical History:   Procedure Laterality Date    TUBAL ABDOMINAL LIGATION         Family History:   Family History   Problem Relation Age of Onset    Diabetes Mother     Cancer Father     Cancer Maternal Grandmother        Social History:   Social History     Socioeconomic History    Marital status: Single   Tobacco Use    Smoking status: Never     Passive exposure: Never    Smokeless tobacco: Never   Vaping Use    Vaping Use: Never used   Substance and Sexual Activity    Alcohol use: Yes     Comment: Social    Drug use: Never    Sexual activity: Yes     Partners: Female       Medications:     Current Outpatient Medications:     albuterol (PROVENTIL) (2.5 MG/3ML) 0.083% nebulizer solution, albuterol sulfate 2.5 mg/3 mL (0.083 %) solution for nebulization  USE 3 ML VIA NEBULIZER THREE TIMES DAILY, Disp: , Rfl: " "    albuterol sulfate  (90 Base) MCG/ACT inhaler, albuterol sulfate HFA 90 mcg/actuation aerosol inhaler  INHALE 1 TO 2 PUFFS EVERY 4 TO 6 HOURS AS NEEDED FOR WHEEZE, Disp: , Rfl:     CBD oil (cannabidiol) capsule, Take  by mouth., Disp: , Rfl:     cetirizine (zyrTEC) 10 MG tablet, , Disp: , Rfl:     fluticasone (FLONASE) 50 MCG/ACT nasal spray, , Disp: , Rfl:     mometasone-formoterol (Dulera) 200-5 MCG/ACT inhaler, Inhale 2 puffs 2 (Two) Times a Day. (Patient taking differently: Inhale 1 puff Daily.), Disp: 1 each, Rfl: 5    montelukast (SINGULAIR) 10 MG tablet, TAKE 1 TABLET BY MOUTH EVERY DAY AT NIGHT, Disp: 90 tablet, Rfl: 0    multivitamin with minerals tablet tablet, Take 1 tablet by mouth Daily., Disp: , Rfl:     Allergies:   Allergies   Allergen Reactions    Ibuprofen Swelling       Objective     Physical Exam  Vitals and nursing note reviewed.   Constitutional:       Appearance: Normal appearance.   Eyes:      Pupils: Pupils are equal, round, and reactive to light.   Cardiovascular:      Rate and Rhythm: Normal rate and regular rhythm.   Pulmonary:      Effort: Pulmonary effort is normal.   Lymphadenopathy:      Upper Body:      Right upper body: Axillary adenopathy (Solitary lymph node enlargement, resolving, approx. 0.5 cm in size, soft., mobile, non-tender) present.   Neurological:      Mental Status: She is alert and oriented to person, place, and time.       Vitals:    01/23/24 0804   BP: 130/98   BP Location: Right arm   Patient Position: Sitting   Cuff Size: Adult   Pulse: 63   SpO2: 98%   Weight: 88.1 kg (194 lb 4.8 oz)   Height: 162.6 cm (64\")   PainSc: 0-No pain     Body mass index is 33.35 kg/m².   BMI is >= 30 and <35. (Class 1 Obesity). The following options were offered after discussion;: exercise counseling/recommendations, nutrition counseling/recommendations, information on healthy weight added to patient's after visit summary , and discussed patient barriers to weight " loss.      Assessment / Plan      Assessment/Plan:   Diagnoses and all orders for this visit:    1. Seasonal allergies (Primary)  Assessment & Plan:  Patient to stop Sudafed and go back to Zyrtec and Flonase to control seasonal allergy symptoms if needed  No other change in treatment plan today  Will continue to evaluate at routine follow-up visits      2. Lymph node enlargement  Assessment & Plan:  Right lymph node swelling almost completely resolved  Patient has no other concerns at this time  Will evaluate at routine follow-up visits and if swelling returns we will consider ultrasound at that time  Patient tells me she was denied screening mammogram previously due to her age, but patient concerned about breast cancer related to maternal grandmother having breast cancer.  May consider BRCA testing.  Will discuss at her follow-up visit in 4 weeks.      3. Elevated blood-pressure reading without diagnosis of hypertension  Assessment & Plan:  Elevated blood pressures over the last several visits-patient did mention she has been taking Sudafed regularly  Patient to stop Sudafed and track her blood pressures over the next several weeks and follow-up for evaluation to determine if pharmacological therapy is indicated   Patient education materials attached to AVS. Materials were reviewed with patient during her office visit today and all questions addressed.      Follow Up:   Return in about 4 weeks (around 2/20/2024) for Recheck.    BILL Bustos (Libby)  [facility] [address] [address]

## 2024-01-23 NOTE — ASSESSMENT & PLAN NOTE
Patient to stop Sudafed and go back to Zyrtec and Flonase to control seasonal allergy symptoms if needed  No other change in treatment plan today  Will continue to evaluate at routine follow-up visits

## 2024-02-22 DIAGNOSIS — J30.2 SEASONAL ALLERGIES: ICD-10-CM

## 2024-02-22 RX ORDER — MONTELUKAST SODIUM 10 MG/1
10 TABLET ORAL
Qty: 90 TABLET | Refills: 0 | Status: SHIPPED | OUTPATIENT
Start: 2024-02-22

## 2024-02-28 ENCOUNTER — OFFICE VISIT (OUTPATIENT)
Dept: FAMILY MEDICINE CLINIC | Facility: CLINIC | Age: 39
End: 2024-02-28
Payer: COMMERCIAL

## 2024-02-28 VITALS
SYSTOLIC BLOOD PRESSURE: 130 MMHG | WEIGHT: 194.4 LBS | BODY MASS INDEX: 33.19 KG/M2 | RESPIRATION RATE: 15 BRPM | HEIGHT: 64 IN | OXYGEN SATURATION: 98 % | DIASTOLIC BLOOD PRESSURE: 100 MMHG | HEART RATE: 70 BPM

## 2024-02-28 DIAGNOSIS — J45.20 MILD INTERMITTENT ASTHMA WITHOUT COMPLICATION: ICD-10-CM

## 2024-02-28 DIAGNOSIS — Z00.00 ANNUAL PHYSICAL EXAM: ICD-10-CM

## 2024-02-28 DIAGNOSIS — R03.0 ELEVATED BLOOD-PRESSURE READING WITHOUT DIAGNOSIS OF HYPERTENSION: Primary | ICD-10-CM

## 2024-02-28 DIAGNOSIS — J30.2 SEASONAL ALLERGIES: ICD-10-CM

## 2024-02-28 DIAGNOSIS — R59.9 LYMPH NODE ENLARGEMENT: ICD-10-CM

## 2024-02-28 RX ORDER — MOMETASONE FUROATE AND FORMOTEROL FUMARATE DIHYDRATE 200; 5 UG/1; UG/1
1 AEROSOL RESPIRATORY (INHALATION) DAILY
Qty: 13 G | Refills: 2 | Status: SHIPPED | OUTPATIENT
Start: 2024-02-28

## 2024-02-28 RX ORDER — ALBUTEROL SULFATE 90 UG/1
2 AEROSOL, METERED RESPIRATORY (INHALATION) EVERY 4 HOURS PRN
Qty: 6.7 G | Refills: 2 | Status: SHIPPED | OUTPATIENT
Start: 2024-02-28

## 2024-02-28 NOTE — PATIENT INSTRUCTIONS
Health Maintenance, Female  Adopting a healthy lifestyle and getting preventive care can go a long way to promote health and wellness. Talk with your health care provider about what schedule of regular examinations is right for you. This is a good chance for you to check in with your provider about disease prevention and staying healthy.  In between checkups, there are plenty of things you can do on your own. Experts have done a lot of research about which lifestyle changes and preventive measures are most likely to keep you healthy. Ask your health care provider for more information.  Weight and diet  Eat a healthy diet  Be sure to include plenty of vegetables, fruits, low-fat dairy products, and lean protein.  Do not eat a lot of foods high in solid fats, added sugars, or salt.  Get regular exercise. This is one of the most important things you can do for your health.  Most adults should exercise for at least 150 minutes each week. The exercise should increase your heart rate and make you sweat (moderate-intensity exercise).  Most adults should also do strengthening exercises at least twice a week. This is in addition to the moderate-intensity exercise.     Maintain a healthy weight  Body mass index (BMI) is a measurement that can be used to identify possible weight problems. It estimates body fat based on height and weight. Your health care provider can help determine your BMI and help you achieve or maintain a healthy weight.  For females 20 years of age and older:  A BMI below 18.5 is considered underweight.  A BMI of 18.5 to 24.9 is normal.  A BMI of 25 to 29.9 is considered overweight.  A BMI of 30 and above is considered obese.     Watch levels of cholesterol and blood lipids  You should start having your blood tested for lipids and cholesterol at 20 years of age, then have this test every 5 years.  You may need to have your cholesterol levels checked more often if:  Your lipid or cholesterol levels are  high.  You are older than 50 years of age.  You are at high risk for heart disease.     Cancer screening  Lung Cancer  Lung cancer screening is recommended for adults 55-80 years old who are at high risk for lung cancer because of a history of smoking.  A yearly low-dose CT scan of the lungs is recommended for people who:  Currently smoke.  Have quit within the past 15 years.  Have at least a 30-pack-year history of smoking. A pack year is smoking an average of one pack of cigarettes a day for 1 year.  Yearly screening should continue until it has been 15 years since you quit.  Yearly screening should stop if you develop a health problem that would prevent you from having lung cancer treatment.     Breast Cancer  Practice breast self-awareness. This means understanding how your breasts normally appear and feel.  It also means doing regular breast self-exams. Let your health care provider know about any changes, no matter how small.  If you are in your 20s or 30s, you should have a clinical breast exam (CBE) by a health care provider every 1-3 years as part of a regular health exam.  If you are 40 or older, have a CBE every year. Also consider having a breast X-ray (mammogram) every year.  If you have a family history of breast cancer, talk to your health care provider about genetic screening.  If you are at high risk for breast cancer, talk to your health care provider about having an MRI and a mammogram every year.  Breast cancer gene (BRCA) assessment is recommended for women who have family members with BRCA-related cancers. BRCA-related cancers include:  Breast.  Ovarian.  Tubal.  Peritoneal cancers.  Results of the assessment will determine the need for genetic counseling and BRCA1 and BRCA2 testing.     Cervical Cancer  Your health care provider may recommend that you be screened regularly for cancer of the pelvic organs (ovaries, uterus, and vagina). This screening involves a pelvic examination, including  checking for microscopic changes to the surface of your cervix (Pap test). You may be encouraged to have this screening done every 3 years, beginning at age 21.  For women ages 30-65, health care providers may recommend pelvic exams and Pap testing every 3 years, or they may recommend the Pap and pelvic exam, combined with testing for human papilloma virus (HPV), every 5 years. Some types of HPV increase your risk of cervical cancer. Testing for HPV may also be done on women of any age with unclear Pap test results.  Other health care providers may not recommend any screening for nonpregnant women who are considered low risk for pelvic cancer and who do not have symptoms. Ask your health care provider if a screening pelvic exam is right for you.  If you have had past treatment for cervical cancer or a condition that could lead to cancer, you need Pap tests and screening for cancer for at least 20 years after your treatment. If Pap tests have been discontinued, your risk factors (such as having a new sexual partner) need to be reassessed to determine if screening should resume. Some women have medical problems that increase the chance of getting cervical cancer. In these cases, your health care provider may recommend more frequent screening and Pap tests.     Colorectal Cancer  This type of cancer can be detected and often prevented.  Routine colorectal cancer screening usually begins at 50 years of age and continues through 75 years of age.  Your health care provider may recommend screening at an earlier age if you have risk factors for colon cancer.  Your health care provider may also recommend using home test kits to check for hidden blood in the stool.  A small camera at the end of a tube can be used to examine your colon directly (sigmoidoscopy or colonoscopy). This is done to check for the earliest forms of colorectal cancer.  Routine screening usually begins at age 50.  Direct examination of the colon should  be repeated every 5-10 years through 75 years of age. However, you may need to be screened more often if early forms of precancerous polyps or small growths are found.     Skin Cancer  Check your skin from head to toe regularly.  Tell your health care provider about any new moles or changes in moles, especially if there is a change in a mole's shape or color.  Also tell your health care provider if you have a mole that is larger than the size of a pencil eraser.  Always use sunscreen. Apply sunscreen liberally and repeatedly throughout the day.  Protect yourself by wearing long sleeves, pants, a wide-brimmed hat, and sunglasses whenever you are outside.     Heart disease, diabetes, and high blood pressure  High blood pressure causes heart disease and increases the risk of stroke. High blood pressure is more likely to develop in:  People who have blood pressure in the high end of the normal range (130-139/85-89 mm Hg).  People who are overweight or obese.  People who are .  If you are 18-39 years of age, have your blood pressure checked every 3-5 years. If you are 40 years of age or older, have your blood pressure checked every year. You should have your blood pressure measured twice--once when you are at a hospital or clinic, and once when you are not at a hospital or clinic. Record the average of the two measurements. To check your blood pressure when you are not at a hospital or clinic, you can use:  An automated blood pressure machine at a pharmacy.  A home blood pressure monitor.  If you are between 55 years and 79 years old, ask your health care provider if you should take aspirin to prevent strokes.  Have regular diabetes screenings. This involves taking a blood sample to check your fasting blood sugar level.  If you are at a normal weight and have a low risk for diabetes, have this test once every three years after 45 years of age.  If you are overweight and have a high risk for diabetes,  consider being tested at a younger age or more often.  Preventing infection  Hepatitis B  If you have a higher risk for hepatitis B, you should be screened for this virus. You are considered at high risk for hepatitis B if:  You were born in a country where hepatitis B is common. Ask your health care provider which countries are considered high risk.  Your parents were born in a high-risk country, and you have not been immunized against hepatitis B (hepatitis B vaccine).  You have HIV or AIDS.  You use needles to inject street drugs.  You live with someone who has hepatitis B.  You have had sex with someone who has hepatitis B.  You get hemodialysis treatment.  You take certain medicines for conditions, including cancer, organ transplantation, and autoimmune conditions.     Hepatitis C  Blood testing is recommended for:  Everyone born from 1945 through 1965.  Anyone with known risk factors for hepatitis C.     Sexually transmitted infections (STIs)  You should be screened for sexually transmitted infections (STIs) including gonorrhea and chlamydia if:  You are sexually active and are younger than 24 years of age.  You are older than 24 years of age and your health care provider tells you that you are at risk for this type of infection.  Your sexual activity has changed since you were last screened and you are at an increased risk for chlamydia or gonorrhea. Ask your health care provider if you are at risk.  If you do not have HIV, but are at risk, it may be recommended that you take a prescription medicine daily to prevent HIV infection. This is called pre-exposure prophylaxis (PrEP). You are considered at risk if:  You are sexually active and do not regularly use condoms or know the HIV status of your partner(s).  You take drugs by injection.  You are sexually active with a partner who has HIV.     Talk with your health care provider about whether you are at high risk of being infected with HIV. If you choose to  begin PrEP, you should first be tested for HIV. You should then be tested every 3 months for as long as you are taking PrEP.  Pregnancy  If you are premenopausal and you may become pregnant, ask your health care provider about preconception counseling.  If you may become pregnant, take 400 to 800 micrograms (mcg) of folic acid every day.  If you want to prevent pregnancy, talk to your health care provider about birth control (contraception).  Osteoporosis and menopause  Osteoporosis is a disease in which the bones lose minerals and strength with aging. This can result in serious bone fractures. Your risk for osteoporosis can be identified using a bone density scan.  If you are 65 years of age or older, or if you are at risk for osteoporosis and fractures, ask your health care provider if you should be screened.  Ask your health care provider whether you should take a calcium or vitamin D supplement to lower your risk for osteoporosis.  Menopause may have certain physical symptoms and risks.  Hormone replacement therapy may reduce some of these symptoms and risks.  Talk to your health care provider about whether hormone replacement therapy is right for you.  Follow these instructions at home:  Schedule regular health, dental, and eye exams.  Stay current with your immunizations.  Do not use any tobacco products including cigarettes, chewing tobacco, or electronic cigarettes.  If you are pregnant, do not drink alcohol.  If you are breastfeeding, limit how much and how often you drink alcohol.  Limit alcohol intake to no more than 1 drink per day for nonpregnant women. One drink equals 12 ounces of beer, 5 ounces of wine, or 1½ ounces of hard liquor.  Do not use street drugs.  Do not share needles.  Ask your health care provider for help if you need support or information about quitting drugs.  Tell your health care provider if you often feel depressed.  Tell your health care provider if you have ever been abused or do  not feel safe at home.  This information is not intended to replace advice given to you by your health care provider. Make sure you discuss any questions you have with your health care provider.  Document Released: 07/02/2012 Document Revised: 05/25/2017 Document Reviewed: 09/20/2016  [employer] Patient Education © 2018 [employer]

## 2024-02-28 NOTE — ASSESSMENT & PLAN NOTE
Patient reports adequate control with Zyrtec and Flonase.  Patient continues to abstain from Sudafed due to blood pressure concerns at her last visit.  No change in treatment regimen today, will evaluate at next routine follow-up visit.

## 2024-02-28 NOTE — ASSESSMENT & PLAN NOTE
Patient due to annual wellness exam May 2024  Patient to schedule annual wellness exam and agrees to come in a week prior to have labs drawn so we can review those at her wellness visit.  See future orders

## 2024-02-28 NOTE — PROGRESS NOTES
"     Follow Up Office Visit      Date:  2024   Patient Name: Jf Reyes  : 1985   MRN: [id number]     Chief Complaint:    Chief Complaint   Patient presents with    Hypertension     Follow up       History of Present Illness: Jf Reyes is a 38 y.o. female who is here today for routine follow-up visit to discuss health goals and address additional care gaps.  Patient's blood pressure is elevated again today but she has been keeping a log of her blood pressures at home which show she is consistently below goal and appears to have some level of \"whitecoat\" reaction.  Patient denies constitutional symptoms and has no questions or concerns.      Re-check of blood pressure  Elevated today in the office  Calibrated pt home cuff to ours and they are similar  Home blood pressures below goal  Asthma symtoms well controlled on 1 puff of dulera  Rescue inhaler with her - does not use often -  - sent new order    Subjective      Answers submitted by the patient for this visit:  Office Visit on 2024  8:00 AM with Deena Valle (Submitted on 2024)  Please describe your symptoms.: recheck after blood pressure monitoring  Have you had these symptoms before?: No  How long have you been having these symptoms?: Greater than 2 weeks      Past Medical History:   Past Medical History:   Diagnosis Date    Allergic     Asthma        Past Surgical History:   Past Surgical History:   Procedure Laterality Date    TUBAL ABDOMINAL LIGATION         Family History:   Family History   Problem Relation Age of Onset    Diabetes Mother     Mental illness Mother         Depression, Borderline Personality Disorder    Cancer Father     Hyperlipidemia Father         recently found out    Cancer Maternal Grandmother     Thyroid disease Sister         Thyroid Cancer-surgical removal and treatment       Social History:   Social History     Socioeconomic History    Marital status: Single   Tobacco Use    Smoking " status: Never     Passive exposure: Never    Smokeless tobacco: Never   Vaping Use    Vaping Use: Never used   Substance and Sexual Activity    Alcohol use: Yes     Comment: Average 1-6 drinks a month    Drug use: Never    Sexual activity: Yes     Partners: Male     Birth control/protection: Tubal ligation     Comment: Boyfriend of 4+ years       Medications:     Current Outpatient Medications:     albuterol (PROVENTIL) (2.5 MG/3ML) 0.083% nebulizer solution, albuterol sulfate 2.5 mg/3 mL (0.083 %) solution for nebulization  USE 3 ML VIA NEBULIZER THREE TIMES DAILY, Disp: , Rfl:     albuterol sulfate  (90 Base) MCG/ACT inhaler, Inhale 2 puffs Every 4 (Four) Hours As Needed for Wheezing or Shortness of Air., Disp: 6.7 g, Rfl: 2    CBD oil (cannabidiol) capsule, Take  by mouth., Disp: , Rfl:     cetirizine (zyrTEC) 10 MG tablet, , Disp: , Rfl:     fluticasone (FLONASE) 50 MCG/ACT nasal spray, , Disp: , Rfl:     mometasone-formoterol (Dulera) 200-5 MCG/ACT inhaler, Inhale 1 puff Daily., Disp: 13 g, Rfl: 2    montelukast (SINGULAIR) 10 MG tablet, TAKE 1 TABLET BY MOUTH EVERY DAY AT NIGHT, Disp: 90 tablet, Rfl: 0    multivitamin with minerals tablet tablet, Take 1 tablet by mouth Daily., Disp: , Rfl:     Allergies:   Allergies   Allergen Reactions    Ibuprofen Swelling       Objective     Physical Exam  Vitals and nursing note reviewed.   Constitutional:       General: She is not in acute distress.     Appearance: Normal appearance. She is not toxic-appearing.   HENT:      Head: Normocephalic.      Nose: Nose normal.   Eyes:      Pupils: Pupils are equal, round, and reactive to light.   Cardiovascular:      Rate and Rhythm: Normal rate and regular rhythm.      Heart sounds: No murmur heard.  Pulmonary:      Effort: Pulmonary effort is normal. No respiratory distress.      Breath sounds: Normal breath sounds.   Musculoskeletal:         General: Normal range of motion.      Cervical back: Normal range of motion and  "neck supple.      Right lower leg: No edema.      Left lower leg: No edema.   Skin:     General: Skin is warm and dry.   Neurological:      Mental Status: She is alert and oriented to person, place, and time.   Psychiatric:         Mood and Affect: Mood normal.         Behavior: Behavior normal.       Vitals:    02/28/24 0811   BP: 130/100   BP Location: Left arm   Patient Position: Sitting   Cuff Size: Large Adult   Pulse: 70   Resp: 15   SpO2: 98%   Weight: 88.2 kg (194 lb 6.4 oz)   Height: 162.6 cm (64.02\")   PainSc: 0-No pain     Body mass index is 33.35 kg/m².        Assessment / Plan      Assessment/Plan:   Diagnoses and all orders for this visit:    1. Elevated blood-pressure reading without diagnosis of hypertension (Primary)  Assessment & Plan:  Patient blood pressure elevated today, home blood pressure log shows blood pressures consistently at goal.  No change in treatment regimen  Patient agrees to continue to monitor blood pressures 2-3 times per week at least, agrees to follow-up with the office sooner than next scheduled routine visit if blood pressures consistently greater than 140/90.  Will reassess at next routine follow-up visit.      2. Seasonal allergies  Assessment & Plan:  Patient reports adequate control with Zyrtec and Flonase.  Patient continues to abstain from Sudafed due to blood pressure concerns at her last visit.  No change in treatment regimen today, will evaluate at next routine follow-up visit.    Orders:  -     mometasone-formoterol (Dulera) 200-5 MCG/ACT inhaler; Inhale 1 puff Daily.  Dispense: 13 g; Refill: 2    3. Mild intermittent asthma without complication  Assessment & Plan:  Asthma is stable.  The patient is experiencing no daytime asthma symptoms and she is experiencing no nighttime asthma symptoms.    Plan: Continue same medication/s without change.    Discussed medication dosage, use, side effects, and goals of treatment in detail.    Discussed distinction between " quick-relief and maintenance control medications.  Discussed monitoring symptoms and use of quick-relief medications and contacting provider early in the course of exacerbations.  Warning signs of respiratory distress were reviewed with the patient.     Patient Treatment Goals: symptom prevention, minimizing limitation in activity, prevention of exacerbations and use of ER/inpatient care, maintenance of optimal pulmonary function, and minimization of adverse effects of treatment.    Followup at the next regular appointment.    Orders:  -     mometasone-formoterol (Dulera) 200-5 MCG/ACT inhaler; Inhale 1 puff Daily.  Dispense: 13 g; Refill: 2  -     albuterol sulfate  (90 Base) MCG/ACT inhaler; Inhale 2 puffs Every 4 (Four) Hours As Needed for Wheezing or Shortness of Air.  Dispense: 6.7 g; Refill: 2    4. Lymph node enlargement  Assessment & Plan:  Previous lymphadenopathy right axilla, at last visit was almost completely resolved  Lymph node is still palpable today, nontender, patient to have follow-up visit in May discussed with patient that if lymph node is still palpable at that time we will consider ultrasound evaluation and early mammogram.  No change in treatment regimen, will evaluate at next routine follow-up visit.      5. Annual physical exam  Assessment & Plan:  Patient due to annual wellness exam May 2024  Patient to schedule annual wellness exam and agrees to come in a week prior to have labs drawn so we can review those at her wellness visit.  See future orders    Orders:  -     CBC Auto Differential; Future  -     Comprehensive Metabolic Panel; Future  -     Lipid Panel; Future  -     TSH Rfx On Abnormal To Free T4; Future       Follow Up:   Return in about 3 months (around 5/28/2024).    BILL Bustos (Libby)  [facility] [address] [address]

## 2024-02-28 NOTE — ASSESSMENT & PLAN NOTE
Asthma is stable.  The patient is experiencing no daytime asthma symptoms and she is experiencing no nighttime asthma symptoms.    Plan: Continue same medication/s without change.    Discussed medication dosage, use, side effects, and goals of treatment in detail.    Discussed distinction between quick-relief and maintenance control medications.  Discussed monitoring symptoms and use of quick-relief medications and contacting provider early in the course of exacerbations.  Warning signs of respiratory distress were reviewed with the patient.     Patient Treatment Goals: symptom prevention, minimizing limitation in activity, prevention of exacerbations and use of ER/inpatient care, maintenance of optimal pulmonary function, and minimization of adverse effects of treatment.    Followup at the next regular appointment.

## 2024-02-28 NOTE — ASSESSMENT & PLAN NOTE
Previous lymphadenopathy right axilla, at last visit was almost completely resolved  Lymph node is still palpable today, nontender, patient to have follow-up visit in May discussed with patient that if lymph node is still palpable at that time we will consider ultrasound evaluation and early mammogram.  No change in treatment regimen, will evaluate at next routine follow-up visit.   Quality 130: Documentation Of Current Medications In The Medical Record: Current Medications Documented Quality 110: Preventive Care And Screening: Influenza Immunization: Influenza Immunization previously received during influenza season Quality 47: Advance Care Plan: Advance Care Planning discussed and documented in the medical record; patient did not wish or was not able to name a surrogate decision maker or provide an advance care plan. Detail Level: Detailed Quality 226: Preventive Care And Screening: Tobacco Use: Screening And Cessation Intervention: Patient screened for tobacco use and is an ex/non-smoker

## 2024-05-24 DIAGNOSIS — J30.2 SEASONAL ALLERGIES: ICD-10-CM

## 2024-05-24 RX ORDER — MONTELUKAST SODIUM 10 MG/1
10 TABLET ORAL
Qty: 90 TABLET | Refills: 0 | Status: SHIPPED | OUTPATIENT
Start: 2024-05-24

## 2024-06-12 ENCOUNTER — LAB (OUTPATIENT)
Dept: FAMILY MEDICINE CLINIC | Facility: CLINIC | Age: 39
End: 2024-06-12
Payer: COMMERCIAL

## 2024-06-12 DIAGNOSIS — Z00.00 ANNUAL PHYSICAL EXAM: ICD-10-CM

## 2024-06-12 PROCEDURE — 36415 COLL VENOUS BLD VENIPUNCTURE: CPT

## 2024-06-13 LAB
ALBUMIN SERPL-MCNC: 4.4 G/DL (ref 3.9–4.9)
ALBUMIN/GLOB SERPL: 1.7 {RATIO}
ALP SERPL-CCNC: 77 IU/L (ref 44–121)
ALT SERPL-CCNC: 14 IU/L (ref 0–32)
AST SERPL-CCNC: 18 IU/L (ref 0–40)
BASOPHILS # BLD AUTO: 0 X10E3/UL (ref 0–0.2)
BASOPHILS NFR BLD AUTO: 0 %
BILIRUB SERPL-MCNC: 0.3 MG/DL (ref 0–1.2)
BUN SERPL-MCNC: 10 MG/DL (ref 6–20)
BUN/CREAT SERPL: 14 (ref 9–23)
CALCIUM SERPL-MCNC: 9.1 MG/DL (ref 8.7–10.2)
CHLORIDE SERPL-SCNC: 105 MMOL/L (ref 96–106)
CHOLEST SERPL-MCNC: 181 MG/DL (ref 100–199)
CO2 SERPL-SCNC: 23 MMOL/L (ref 20–29)
CREAT SERPL-MCNC: 0.72 MG/DL (ref 0.57–1)
EGFRCR SERPLBLD CKD-EPI 2021: 110 ML/MIN/1.73
EOSINOPHIL # BLD AUTO: 0.1 X10E3/UL (ref 0–0.4)
EOSINOPHIL NFR BLD AUTO: 2 %
ERYTHROCYTE [DISTWIDTH] IN BLOOD BY AUTOMATED COUNT: 12.3 % (ref 11.7–15.4)
GLOBULIN SER CALC-MCNC: 2.6 G/DL (ref 1.5–4.5)
GLUCOSE SERPL-MCNC: 83 MG/DL (ref 70–99)
HCT VFR BLD AUTO: 40.1 % (ref 34–46.6)
HDLC SERPL-MCNC: 51 MG/DL
HGB BLD-MCNC: 13 G/DL (ref 11.1–15.9)
IMM GRANULOCYTES # BLD AUTO: 0 X10E3/UL (ref 0–0.1)
IMM GRANULOCYTES NFR BLD AUTO: 0 %
LDLC SERPL CALC-MCNC: 118 MG/DL (ref 0–99)
LYMPHOCYTES # BLD AUTO: 1.8 X10E3/UL (ref 0.7–3.1)
LYMPHOCYTES NFR BLD AUTO: 26 %
MCH RBC QN AUTO: 30.1 PG (ref 26.6–33)
MCHC RBC AUTO-ENTMCNC: 32.4 G/DL (ref 31.5–35.7)
MCV RBC AUTO: 93 FL (ref 79–97)
MONOCYTES # BLD AUTO: 0.5 X10E3/UL (ref 0.1–0.9)
MONOCYTES NFR BLD AUTO: 7 %
NEUTROPHILS # BLD AUTO: 4.5 X10E3/UL (ref 1.4–7)
NEUTROPHILS NFR BLD AUTO: 65 %
PLATELET # BLD AUTO: 309 X10E3/UL (ref 150–450)
POTASSIUM SERPL-SCNC: 4.1 MMOL/L (ref 3.5–5.2)
PROT SERPL-MCNC: 7 G/DL (ref 6–8.5)
RBC # BLD AUTO: 4.32 X10E6/UL (ref 3.77–5.28)
SODIUM SERPL-SCNC: 140 MMOL/L (ref 134–144)
TRIGL SERPL-MCNC: 63 MG/DL (ref 0–149)
TSH SERPL DL<=0.005 MIU/L-ACNC: 2.38 UIU/ML (ref 0.45–4.5)
VLDLC SERPL CALC-MCNC: 12 MG/DL (ref 5–40)
WBC # BLD AUTO: 6.9 X10E3/UL (ref 3.4–10.8)

## 2024-06-24 ENCOUNTER — OFFICE VISIT (OUTPATIENT)
Dept: FAMILY MEDICINE CLINIC | Facility: CLINIC | Age: 39
End: 2024-06-24
Payer: COMMERCIAL

## 2024-06-24 VITALS
BODY MASS INDEX: 32.78 KG/M2 | DIASTOLIC BLOOD PRESSURE: 90 MMHG | HEIGHT: 64 IN | HEART RATE: 81 BPM | SYSTOLIC BLOOD PRESSURE: 120 MMHG | RESPIRATION RATE: 18 BRPM | OXYGEN SATURATION: 100 % | WEIGHT: 192 LBS

## 2024-06-24 DIAGNOSIS — J45.20 MILD INTERMITTENT ASTHMA WITHOUT COMPLICATION: ICD-10-CM

## 2024-06-24 DIAGNOSIS — R03.0 ELEVATED BLOOD-PRESSURE READING WITHOUT DIAGNOSIS OF HYPERTENSION: ICD-10-CM

## 2024-06-24 DIAGNOSIS — Z00.00 ANNUAL PHYSICAL EXAM: Primary | ICD-10-CM

## 2024-06-24 DIAGNOSIS — J30.2 SEASONAL ALLERGIES: ICD-10-CM

## 2024-06-24 DIAGNOSIS — R59.9 LYMPH NODE ENLARGEMENT: ICD-10-CM

## 2024-06-24 PROCEDURE — 99214 OFFICE O/P EST MOD 30 MIN: CPT

## 2024-06-24 RX ORDER — MOMETASONE FUROATE AND FORMOTEROL FUMARATE DIHYDRATE 200; 5 UG/1; UG/1
1 AEROSOL RESPIRATORY (INHALATION) DAILY
Qty: 13 G | Refills: 2 | Status: SHIPPED | OUTPATIENT
Start: 2024-06-24

## 2024-06-24 NOTE — ASSESSMENT & PLAN NOTE
Home blood pressure log shows blood pressure well within goal.  No change in treatment plan at this time.  Will reassess at routine follow-up visits unless otherwise indicated.

## 2024-06-24 NOTE — ASSESSMENT & PLAN NOTE
Patient presents today for annual wellness exam with chief complaint of persistent lymph node, right axilla.  Lab results from 6/13/2024 were reviewed during patient's visit today and all questions addressed to patient's satisfaction.  Current medication refills provided per patient request.  Patient education materials attached to AVS related to health maintenance recommendations and healthy lifestyle. Materials were reviewed with patient during their office visit today and all questions addressed.  Patient agrees to follow-up in 6 months, unless otherwise indicated.

## 2024-06-24 NOTE — PATIENT INSTRUCTIONS
Health Maintenance, Female  Adopting a healthy lifestyle and getting preventive care can go a long way to promote health and wellness. Talk with your health care provider about what schedule of regular examinations is right for you. This is a good chance for you to check in with your provider about disease prevention and staying healthy.  In between checkups, there are plenty of things you can do on your own. Experts have done a lot of research about which lifestyle changes and preventive measures are most likely to keep you healthy. Ask your health care provider for more information.  Weight and diet  Eat a healthy diet  Be sure to include plenty of vegetables, fruits, low-fat dairy products, and lean protein.  Do not eat a lot of foods high in solid fats, added sugars, or salt.  Get regular exercise. This is one of the most important things you can do for your health.  Most adults should exercise for at least 150 minutes each week. The exercise should increase your heart rate and make you sweat (moderate-intensity exercise).  Most adults should also do strengthening exercises at least twice a week. This is in addition to the moderate-intensity exercise.     Maintain a healthy weight  Body mass index (BMI) is a measurement that can be used to identify possible weight problems. It estimates body fat based on height and weight. Your health care provider can help determine your BMI and help you achieve or maintain a healthy weight.  For females 20 years of age and older:  A BMI below 18.5 is considered underweight.  A BMI of 18.5 to 24.9 is normal.  A BMI of 25 to 29.9 is considered overweight.  A BMI of 30 and above is considered obese.     Watch levels of cholesterol and blood lipids  You should start having your blood tested for lipids and cholesterol at 20 years of age, then have this test every 5 years.  You may need to have your cholesterol levels checked more often if:  Your lipid or cholesterol levels are  high.  You are older than 50 years of age.  You are at high risk for heart disease.     Cancer screening  Lung Cancer  Lung cancer screening is recommended for adults 55-80 years old who are at high risk for lung cancer because of a history of smoking.  A yearly low-dose CT scan of the lungs is recommended for people who:  Currently smoke.  Have quit within the past 15 years.  Have at least a 30-pack-year history of smoking. A pack year is smoking an average of one pack of cigarettes a day for 1 year.  Yearly screening should continue until it has been 15 years since you quit.  Yearly screening should stop if you develop a health problem that would prevent you from having lung cancer treatment.     Breast Cancer  Practice breast self-awareness. This means understanding how your breasts normally appear and feel.  It also means doing regular breast self-exams. Let your health care provider know about any changes, no matter how small.  If you are in your 20s or 30s, you should have a clinical breast exam (CBE) by a health care provider every 1-3 years as part of a regular health exam.  If you are 40 or older, have a CBE every year. Also consider having a breast X-ray (mammogram) every year.  If you have a family history of breast cancer, talk to your health care provider about genetic screening.  If you are at high risk for breast cancer, talk to your health care provider about having an MRI and a mammogram every year.  Breast cancer gene (BRCA) assessment is recommended for women who have family members with BRCA-related cancers. BRCA-related cancers include:  Breast.  Ovarian.  Tubal.  Peritoneal cancers.  Results of the assessment will determine the need for genetic counseling and BRCA1 and BRCA2 testing.     Cervical Cancer  Your health care provider may recommend that you be screened regularly for cancer of the pelvic organs (ovaries, uterus, and vagina). This screening involves a pelvic examination, including  checking for microscopic changes to the surface of your cervix (Pap test). You may be encouraged to have this screening done every 3 years, beginning at age 21.  For women ages 30-65, health care providers may recommend pelvic exams and Pap testing every 3 years, or they may recommend the Pap and pelvic exam, combined with testing for human papilloma virus (HPV), every 5 years. Some types of HPV increase your risk of cervical cancer. Testing for HPV may also be done on women of any age with unclear Pap test results.  Other health care providers may not recommend any screening for nonpregnant women who are considered low risk for pelvic cancer and who do not have symptoms. Ask your health care provider if a screening pelvic exam is right for you.  If you have had past treatment for cervical cancer or a condition that could lead to cancer, you need Pap tests and screening for cancer for at least 20 years after your treatment. If Pap tests have been discontinued, your risk factors (such as having a new sexual partner) need to be reassessed to determine if screening should resume. Some women have medical problems that increase the chance of getting cervical cancer. In these cases, your health care provider may recommend more frequent screening and Pap tests.     Colorectal Cancer  This type of cancer can be detected and often prevented.  Routine colorectal cancer screening usually begins at 50 years of age and continues through 75 years of age.  Your health care provider may recommend screening at an earlier age if you have risk factors for colon cancer.  Your health care provider may also recommend using home test kits to check for hidden blood in the stool.  A small camera at the end of a tube can be used to examine your colon directly (sigmoidoscopy or colonoscopy). This is done to check for the earliest forms of colorectal cancer.  Routine screening usually begins at age 50.  Direct examination of the colon should  be repeated every 5-10 years through 75 years of age. However, you may need to be screened more often if early forms of precancerous polyps or small growths are found.     Skin Cancer  Check your skin from head to toe regularly.  Tell your health care provider about any new moles or changes in moles, especially if there is a change in a mole's shape or color.  Also tell your health care provider if you have a mole that is larger than the size of a pencil eraser.  Always use sunscreen. Apply sunscreen liberally and repeatedly throughout the day.  Protect yourself by wearing long sleeves, pants, a wide-brimmed hat, and sunglasses whenever you are outside.     Heart disease, diabetes, and high blood pressure  High blood pressure causes heart disease and increases the risk of stroke. High blood pressure is more likely to develop in:  People who have blood pressure in the high end of the normal range (130-139/85-89 mm Hg).  People who are overweight or obese.  People who are .  If you are 18-39 years of age, have your blood pressure checked every 3-5 years. If you are 40 years of age or older, have your blood pressure checked every year. You should have your blood pressure measured twice--once when you are at a hospital or clinic, and once when you are not at a hospital or clinic. Record the average of the two measurements. To check your blood pressure when you are not at a hospital or clinic, you can use:  An automated blood pressure machine at a pharmacy.  A home blood pressure monitor.  If you are between 55 years and 79 years old, ask your health care provider if you should take aspirin to prevent strokes.  Have regular diabetes screenings. This involves taking a blood sample to check your fasting blood sugar level.  If you are at a normal weight and have a low risk for diabetes, have this test once every three years after 45 years of age.  If you are overweight and have a high risk for diabetes,  consider being tested at a younger age or more often.  Preventing infection  Hepatitis B  If you have a higher risk for hepatitis B, you should be screened for this virus. You are considered at high risk for hepatitis B if:  You were born in a country where hepatitis B is common. Ask your health care provider which countries are considered high risk.  Your parents were born in a high-risk country, and you have not been immunized against hepatitis B (hepatitis B vaccine).  You have HIV or AIDS.  You use needles to inject street drugs.  You live with someone who has hepatitis B.  You have had sex with someone who has hepatitis B.  You get hemodialysis treatment.  You take certain medicines for conditions, including cancer, organ transplantation, and autoimmune conditions.     Hepatitis C  Blood testing is recommended for:  Everyone born from 1945 through 1965.  Anyone with known risk factors for hepatitis C.     Sexually transmitted infections (STIs)  You should be screened for sexually transmitted infections (STIs) including gonorrhea and chlamydia if:  You are sexually active and are younger than 24 years of age.  You are older than 24 years of age and your health care provider tells you that you are at risk for this type of infection.  Your sexual activity has changed since you were last screened and you are at an increased risk for chlamydia or gonorrhea. Ask your health care provider if you are at risk.  If you do not have HIV, but are at risk, it may be recommended that you take a prescription medicine daily to prevent HIV infection. This is called pre-exposure prophylaxis (PrEP). You are considered at risk if:  You are sexually active and do not regularly use condoms or know the HIV status of your partner(s).  You take drugs by injection.  You are sexually active with a partner who has HIV.     Talk with your health care provider about whether you are at high risk of being infected with HIV. If you choose to  begin PrEP, you should first be tested for HIV. You should then be tested every 3 months for as long as you are taking PrEP.  Pregnancy  If you are premenopausal and you may become pregnant, ask your health care provider about preconception counseling.  If you may become pregnant, take 400 to 800 micrograms (mcg) of folic acid every day.  If you want to prevent pregnancy, talk to your health care provider about birth control (contraception).  Osteoporosis and menopause  Osteoporosis is a disease in which the bones lose minerals and strength with aging. This can result in serious bone fractures. Your risk for osteoporosis can be identified using a bone density scan.  If you are 65 years of age or older, or if you are at risk for osteoporosis and fractures, ask your health care provider if you should be screened.  Ask your health care provider whether you should take a calcium or vitamin D supplement to lower your risk for osteoporosis.  Menopause may have certain physical symptoms and risks.  Hormone replacement therapy may reduce some of these symptoms and risks.  Talk to your health care provider about whether hormone replacement therapy is right for you.  Follow these instructions at home:  Schedule regular health, dental, and eye exams.  Stay current with your immunizations.  Do not use any tobacco products including cigarettes, chewing tobacco, or electronic cigarettes.  If you are pregnant, do not drink alcohol.  If you are breastfeeding, limit how much and how often you drink alcohol.  Limit alcohol intake to no more than 1 drink per day for nonpregnant women. One drink equals 12 ounces of beer, 5 ounces of wine, or 1½ ounces of hard liquor.  Do not use street drugs.  Do not share needles.  Ask your health care provider for help if you need support or information about quitting drugs.  Tell your health care provider if you often feel depressed.  Tell your health care provider if you have ever been abused or do  not feel safe at home.  This information is not intended to replace advice given to you by your health care provider. Make sure you discuss any questions you have with your health care provider.  Document Released: 07/02/2012 Document Revised: 05/25/2017 Document Reviewed: 09/20/2016  [employer] Interactive Patient Education © 2018 [employer]

## 2024-06-24 NOTE — PROGRESS NOTES
Follow Up Office Visit      Date:  2024   Patient Name: Jf Reyes  : 1985   MRN: [id number]     Chief Complaint:    Chief Complaint   Patient presents with    Hypertension     Follow up     Results     Follow up on labs.        History of Present Illness: Jf Reyes is a 39 y.o. female who is here today to follow up on hypertension and lab results. Pt blood pressure log at home is consistently below goal.  Patient provided blood pressure log this morning that shows home blood pressures are well within goal.  Patient continues to have mild lymph node enlargement, right axilla-nontender.  Otherwise, patient denies illness or injury since last visit. Denies falls, unexplained weight change, fevers, chills, or other constitutional symptoms and has no additional questions or concens at this time.    Increasing activity levels but diet is not terrible    Answers submitted by the patient for this visit:  Other (Submitted on 2024)  Please describe your symptoms.: Yearly checkup ang going iver labs  Have you had these symptoms before?: No  How long have you been having these symptoms?: 1-4 days  Primary Reason for Visit (Submitted on 2024)  What is the primary reason for your visit?: Other      Subjective      Past Medical History:   Diagnosis Date    Allergic     Asthma        Past Surgical History:   Procedure Laterality Date    TUBAL ABDOMINAL LIGATION         Family History   Problem Relation Age of Onset    Diabetes Mother     Mental illness Mother         Depression, Borderline Personality Disorder    Cancer Father     Hyperlipidemia Father         recently found out    Cancer Maternal Grandmother     Thyroid disease Sister         Thyroid Cancer-surgical removal and treatment       Social History     Socioeconomic History    Marital status: Single   Tobacco Use    Smoking status: Never     Passive exposure: Never    Smokeless tobacco: Never   Vaping Use    Vaping status: Never Used    Substance and Sexual Activity    Alcohol use: Yes     Comment: Average 1-6 drinks a month    Drug use: Never    Sexual activity: Yes     Partners: Male     Birth control/protection: Tubal ligation     Comment: Boyfriend of 4+ years         Current Outpatient Medications:     albuterol (PROVENTIL) (2.5 MG/3ML) 0.083% nebulizer solution, albuterol sulfate 2.5 mg/3 mL (0.083 %) solution for nebulization  USE 3 ML VIA NEBULIZER THREE TIMES DAILY, Disp: , Rfl:     albuterol sulfate  (90 Base) MCG/ACT inhaler, Inhale 2 puffs Every 4 (Four) Hours As Needed for Wheezing or Shortness of Air., Disp: 6.7 g, Rfl: 2    CBD oil (cannabidiol) capsule, Take  by mouth., Disp: , Rfl:     cetirizine (zyrTEC) 10 MG tablet, , Disp: , Rfl:     fluticasone (FLONASE) 50 MCG/ACT nasal spray, , Disp: , Rfl:     mometasone-formoterol (Dulera) 200-5 MCG/ACT inhaler, Inhale 1 puff Daily., Disp: 13 g, Rfl: 2    montelukast (SINGULAIR) 10 MG tablet, TAKE 1 TABLET BY MOUTH EVERY DAY AT NIGHT, Disp: 90 tablet, Rfl: 0    multivitamin with minerals tablet tablet, Take 1 tablet by mouth Daily., Disp: , Rfl:     Allergies   Allergen Reactions    Ibuprofen Swelling       Objective     Physical Exam  Vitals and nursing note reviewed.   Constitutional:       General: She is not in acute distress.     Appearance: She is not toxic-appearing.   HENT:      Head: Normocephalic and atraumatic.      Right Ear: Tympanic membrane, ear canal and external ear normal.      Left Ear: Tympanic membrane, ear canal and external ear normal.      Nose: Nose normal.      Mouth/Throat:      Mouth: Mucous membranes are moist.      Pharynx: No oropharyngeal exudate or posterior oropharyngeal erythema.   Eyes:      Extraocular Movements: Extraocular movements intact.      Conjunctiva/sclera: Conjunctivae normal.      Pupils: Pupils are equal, round, and reactive to light.   Cardiovascular:      Rate and Rhythm: Normal rate and regular rhythm.      Pulses: Normal  "pulses.      Heart sounds: Normal heart sounds.   Pulmonary:      Effort: Pulmonary effort is normal. No respiratory distress.      Breath sounds: Normal breath sounds.   Abdominal:      General: Bowel sounds are normal. There is no distension.      Palpations: Abdomen is soft.      Tenderness: There is no abdominal tenderness.   Musculoskeletal:         General: Normal range of motion.      Cervical back: Normal range of motion and neck supple. No tenderness.      Right lower leg: No edema.      Left lower leg: No edema.   Lymphadenopathy:      Cervical: No cervical adenopathy.      Upper Body:      Right upper body: Axillary adenopathy (Single lymph node, approximately 0.5 cm, soft, mobile, nontender) present.   Skin:     General: Skin is warm and dry.      Capillary Refill: Capillary refill takes less than 2 seconds.   Neurological:      General: No focal deficit present.      Mental Status: She is alert and oriented to person, place, and time.      Motor: No weakness.      Gait: Gait normal.   Psychiatric:         Mood and Affect: Mood normal.         Behavior: Behavior normal.         Thought Content: Thought content does not include suicidal ideation.       Vitals:    06/24/24 0813   BP: 120/90   BP Location: Right arm   Patient Position: Sitting   Cuff Size: Large Adult   Pulse: 81   Resp: 18   SpO2: 100%   Weight: 87.1 kg (192 lb)   Height: 162.6 cm (64.02\")   PainSc: 0-No pain     Body mass index is 32.94 kg/m².       Assessment / Plan      Diagnoses and all orders for this visit:    1. Annual physical exam (Primary)  Assessment & Plan:  Patient presents today for annual wellness exam with chief complaint of persistent lymph node, right axilla.  Lab results from 6/13/2024 were reviewed during patient's visit today and all questions addressed to patient's satisfaction.  Current medication refills provided per patient request.  Patient education materials attached to AVS related to health maintenance " recommendations and healthy lifestyle. Materials were reviewed with patient during their office visit today and all questions addressed.  Patient agrees to follow-up in 6 months, unless otherwise indicated.        2. Mild intermittent asthma without complication  Assessment & Plan:  Mild intermittent asthma symptoms well-controlled on current treatment regimen.  No change in treatment plan at this time.  Will re-evaluate as needed and/or at routine follow-up visits    Orders:  -     mometasone-formoterol (Dulera) 200-5 MCG/ACT inhaler; Inhale 1 puff Daily.  Dispense: 13 g; Refill: 2    3. Elevated blood-pressure reading without diagnosis of hypertension  Assessment & Plan:  Home blood pressure log shows blood pressure well within goal.  No change in treatment plan at this time.  Will reassess at routine follow-up visits unless otherwise indicated.      4. Lymph node enlargement  Assessment & Plan:  Persistent lymphadenopathy right axilla.  Ultrasound ordered, see orders.  Patient to follow-up in 6 months unless otherwise indicated.    Orders:  -     US Axilla Right; Future    5. Seasonal allergies  -     mometasone-formoterol (Dulera) 200-5 MCG/ACT inhaler; Inhale 1 puff Daily.  Dispense: 13 g; Refill: 2       Most recent diagnostic testing results were reviewed and discussed with the patient during their appointment today and all questions addressed to patient's satisfaction.     Return in about 6 months (around 12/24/2024).    [facility]  [address] [place] [id number] [phone]    NOTE TO PATIENT: The 21st Century Cures Act makes medical notes like these available to patients in the interest of transparency. However, be advised this is a medical document. It is intended as peer to peer communication. It is written in medical language and may contain abbreviations or verbiage that are unfamiliar. It may appear blunt or  direct. Medical documents are intended to carry relevant information, facts as evident, and the clinical opinion of the practitioner.     EMR Dragon/Transcription disclaimer:  Much of this encounter note is an electronic transcription of spoken language to printed text. Electronic transcription of spoken language may permit erroneous, or at times, nonsensical words or phrases to be inadvertently transcribed. Although I have reviewed the note for such errors, some may still exist.

## 2024-06-24 NOTE — ASSESSMENT & PLAN NOTE
Persistent lymphadenopathy right axilla.  Ultrasound ordered, see orders.  Patient to follow-up in 6 months unless otherwise indicated.

## 2024-06-24 NOTE — ASSESSMENT & PLAN NOTE
Mild intermittent asthma symptoms well-controlled on current treatment regimen.  No change in treatment plan at this time.  Will re-evaluate as needed and/or at routine follow-up visits

## 2024-06-26 ENCOUNTER — TELEPHONE (OUTPATIENT)
Dept: FAMILY MEDICINE CLINIC | Facility: CLINIC | Age: 39
End: 2024-06-26
Payer: COMMERCIAL

## 2024-08-21 ENCOUNTER — HOSPITAL ENCOUNTER (OUTPATIENT)
Facility: HOSPITAL | Age: 39
Discharge: HOME | End: 2024-08-21
Payer: COMMERCIAL

## 2024-08-21 DIAGNOSIS — R59.9 LYMPH NODE ENLARGEMENT: ICD-10-CM

## 2024-08-21 PROCEDURE — 76882 US LMTD JT/FCL EVL NVASC XTR: CPT

## 2024-08-23 DIAGNOSIS — J30.2 SEASONAL ALLERGIES: ICD-10-CM

## 2024-08-23 RX ORDER — MONTELUKAST SODIUM 10 MG/1
10 TABLET ORAL
Qty: 90 TABLET | Refills: 0 | Status: SHIPPED | OUTPATIENT
Start: 2024-08-23

## 2024-11-20 DIAGNOSIS — J30.2 SEASONAL ALLERGIES: ICD-10-CM

## 2024-11-20 RX ORDER — MONTELUKAST SODIUM 10 MG/1
10 TABLET ORAL
Qty: 30 TABLET | Refills: 0 | Status: SHIPPED | OUTPATIENT
Start: 2024-11-20

## 2024-12-09 DIAGNOSIS — J30.2 SEASONAL ALLERGIES: ICD-10-CM

## 2024-12-09 RX ORDER — MONTELUKAST SODIUM 10 MG/1
10 TABLET ORAL
Qty: 30 TABLET | Refills: 0 | Status: SHIPPED | OUTPATIENT
Start: 2024-12-09

## 2024-12-30 ENCOUNTER — OFFICE VISIT (OUTPATIENT)
Dept: FAMILY MEDICINE CLINIC | Facility: CLINIC | Age: 39
End: 2024-12-30
Payer: COMMERCIAL

## 2024-12-30 VITALS
HEART RATE: 77 BPM | HEIGHT: 64 IN | OXYGEN SATURATION: 99 % | SYSTOLIC BLOOD PRESSURE: 126 MMHG | TEMPERATURE: 98.1 F | DIASTOLIC BLOOD PRESSURE: 70 MMHG | BODY MASS INDEX: 33.67 KG/M2 | RESPIRATION RATE: 16 BRPM | WEIGHT: 197.2 LBS

## 2024-12-30 DIAGNOSIS — J30.2 SEASONAL ALLERGIES: ICD-10-CM

## 2024-12-30 DIAGNOSIS — R03.0 ELEVATED BLOOD-PRESSURE READING WITHOUT DIAGNOSIS OF HYPERTENSION: Primary | ICD-10-CM

## 2024-12-30 DIAGNOSIS — R59.9 LYMPH NODE ENLARGEMENT: ICD-10-CM

## 2024-12-30 DIAGNOSIS — Z00.00 ANNUAL PHYSICAL EXAM: ICD-10-CM

## 2024-12-30 DIAGNOSIS — J30.81 ALLERGY TO ANIMAL DANDER: ICD-10-CM

## 2024-12-30 PROCEDURE — 99214 OFFICE O/P EST MOD 30 MIN: CPT

## 2024-12-30 RX ORDER — MONTELUKAST SODIUM 10 MG/1
10 TABLET ORAL
Qty: 90 TABLET | Refills: 1 | Status: SHIPPED | OUTPATIENT
Start: 2024-12-30

## 2024-12-30 RX ORDER — EPINEPHRINE 0.3 MG/.3ML
0.3 INJECTION SUBCUTANEOUS ONCE
Qty: 1 EACH | Refills: 0 | Status: SHIPPED | OUTPATIENT
Start: 2024-12-30 | End: 2024-12-30

## 2024-12-30 NOTE — PROGRESS NOTES
Follow Up Office Visit      Date:  2024   Patient Name: Jf Reyes  : 1985   MRN: [id number]     Chief Complaint   Patient presents with    Hypertension       History of Present Illness: fJ Reyes is a 39 y.o. female who is here today for follow up r/t HTN. She reports overall doing well and has no specific concerns today.  Her blood pressure today is below goal and she reports it is consistently below goal at home.  She is very pleased that her blood pressure is normal in the office today as well.  Patient denies any new illness or injury since last visit. Denies falls, unexplained weight change, fevers, chills, or other constitutional symptoms and has no additional questions or concens at this time.    [facility] - pt plans to call and schedule herself for pap    Subjective      Past Medical History:   Diagnosis Date    Allergic     Asthma        Past Surgical History:   Procedure Laterality Date    TUBAL ABDOMINAL LIGATION         Family History   Problem Relation Age of Onset    Diabetes Mother     Mental illness Mother         Depression, Borderline Personality Disorder    Cancer Father     Hyperlipidemia Father         recently found out    Cancer Maternal Grandmother     Thyroid disease Sister         Thyroid Cancer-surgical removal and treatment       Social History     Socioeconomic History    Marital status: Single   Tobacco Use    Smoking status: Never     Passive exposure: Never    Smokeless tobacco: Never   Vaping Use    Vaping status: Never Used   Substance and Sexual Activity    Alcohol use: Yes     Comment: Average 1-6 drinks a month    Drug use: Never    Sexual activity: Yes     Partners: Male     Birth control/protection: Tubal ligation     Comment: Boyfriend of 4+ years       Current Outpatient Medications   Medication Instructions    albuterol sulfate  (90 Base) MCG/ACT inhaler 2 puffs, Inhalation, Every 4 Hours PRN    CBD oil (cannabidiol) capsule Take   "by mouth.    cetirizine (zyrTEC) 10 MG tablet No dose, route, or frequency recorded.    EPINEPHrine (EPIPEN 2-ARIE) 0.3 mg, Subcutaneous, Once    fluticasone (FLONASE) 50 MCG/ACT nasal spray No dose, route, or frequency recorded.    mometasone-formoterol (Dulera) 200-5 MCG/ACT inhaler 1 puff, Inhalation, Daily    montelukast (SINGULAIR) 10 mg, Oral, Every Night at Bedtime    multivitamin with minerals tablet tablet 1 tablet, Daily       Allergies   Allergen Reactions    Rabbit-Derived Products Anaphylaxis     Airway swelling    Ibuprofen Swelling       Objective     Physical Exam  Vitals and nursing note reviewed.   Constitutional:       General: She is not in acute distress.     Appearance: Normal appearance. She is not toxic-appearing.   HENT:      Head: Normocephalic.   Eyes:      Pupils: Pupils are equal, round, and reactive to light.   Cardiovascular:      Rate and Rhythm: Normal rate and regular rhythm.      Heart sounds: No murmur heard.  Pulmonary:      Effort: Pulmonary effort is normal. No respiratory distress.      Breath sounds: Normal breath sounds.   Musculoskeletal:         General: Normal range of motion.      Cervical back: Normal range of motion and neck supple.      Right lower leg: No edema.      Left lower leg: No edema.   Skin:     General: Skin is warm and dry.   Neurological:      Mental Status: She is alert.   Psychiatric:         Mood and Affect: Mood normal.         Behavior: Behavior normal.       Vitals:    12/30/24 0821   BP: 126/70   BP Location: Left arm   Patient Position: Sitting   Cuff Size: Large Adult   Pulse: 77   Resp: 16   Temp: 98.1 °F (36.7 °C)   TempSrc: Oral   SpO2: 99%   Weight: 89.4 kg (197 lb 3.2 oz)   Height: 162.6 cm (64.02\")   PainSc: 0-No pain     Body mass index is 33.83 kg/m².     No results found for this or any previous visit.     The ASCVD Risk score (Jose Luis DK, et al., 2019) failed to calculate for the following reasons:    The 2019 ASCVD risk score is only " valid for ages 40 to 79         Assessment / Plan      Diagnoses and all orders for this visit:    1. Elevated blood-pressure reading without diagnosis of hypertension (Primary)  Assessment & Plan:  Home blood pressures at or below goal  Blood pressure today also below goal  No change in treatment regimen at this time  Patient to check blood pressures occasionally and will return if they are consistently >140s over 90s  Patient to follow-up in 6 months for annual wellness visit      2. 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 Indications: Life-Threatening Hypersensitivity Reaction  Dispense: 1 each; Refill: 0    3. Seasonal allergies  -     montelukast (SINGULAIR) 10 MG tablet; Take 1 tablet by mouth every night at bedtime.  Dispense: 90 tablet; Refill: 1    4. 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  She will  discuss breast cancer history and early mammogram    Plan:  Patient to establish with OB/GYN  Monitor axillary lymph node and report if there are any changes prior to her annual wellness exam in June 5. Annual physical exam  Assessment & Plan:  Future lab orders placed for annual wellness exam  Patient to return June 2025 for annual wellness exam    Orders:  -     CBC Auto Differential; Future  -     Comprehensive Metabolic Panel; Future  -     Hemoglobin A1c; Future  -     Lipid Panel; Future  -     TSH Rfx On Abnormal To Free T4; Future    Patient Education:   Reviewed medications, potential side effects and signs and symptoms to report.   Discussed risk vs benefits of treatment plan with patient including medications, labs, and imaging.    Patient education materials attached to AVS and reviewed with patient during office visit today.   Addressed questions and concerns during visit. Patient verbalized understanding and agrees with tx plan.   Instructed to call the office with any questions and report to ER with any life-threatening symptoms.    Return in about 6 months (around 6/30/2025) for Annual physical, Patient will need labs before next visit please.    BILL Bustos (Libby)  [facility]   [address] [id number] [phone]    NOTE TO PATIENT: The 21st Century Cures Act makes medical notes like these available to patients in the interest of transparency. However, be advised this is a medical document. It is intended as peer to peer communication. It is written in medical language and may contain abbreviations or verbiage that are unfamiliar. It may appear blunt or direct. Medical documents are intended to carry relevant information, facts as evident, and the clinical opinion of the practitioner.      EMR Dragon/Transcription disclaimer: Much of this encounter note is an electronic transcription of spoken language to printed text. Electronic  transcription of spoken language may permit erroneous, or at times, nonsensical words or phrases to be inadvertently transcribed. Although I have reviewed the note for such errors, some may still exist.

## 2024-12-30 NOTE — ASSESSMENT & PLAN NOTE
Future lab orders placed for annual wellness exam  Patient to return June 2025 for annual wellness exam

## 2024-12-30 NOTE — PATIENT INSTRUCTIONS
Health Maintenance, Female  Adopting a healthy lifestyle and getting preventive care can go a long way to promote health and wellness. Talk with your health care provider about what schedule of regular examinations is right for you. This is a good chance for you to check in with your provider about disease prevention and staying healthy.  In between checkups, there are plenty of things you can do on your own. Experts have done a lot of research about which lifestyle changes and preventive measures are most likely to keep you healthy. Ask your health care provider for more information.  Weight and diet  Eat a healthy diet  Be sure to include plenty of vegetables, fruits, low-fat dairy products, and lean protein.  Do not eat a lot of foods high in solid fats, added sugars, or salt.  Get regular exercise. This is one of the most important things you can do for your health.  Most adults should exercise for at least 150 minutes each week. The exercise should increase your heart rate and make you sweat (moderate-intensity exercise).  Most adults should also do strengthening exercises at least twice a week. This is in addition to the moderate-intensity exercise.     Maintain a healthy weight  Body mass index (BMI) is a measurement that can be used to identify possible weight problems. It estimates body fat based on height and weight. Your health care provider can help determine your BMI and help you achieve or maintain a healthy weight.  For females 20 years of age and older:  A BMI below 18.5 is considered underweight.  A BMI of 18.5 to 24.9 is normal.  A BMI of 25 to 29.9 is considered overweight.  A BMI of 30 and above is considered obese.     Watch levels of cholesterol and blood lipids  You should start having your blood tested for lipids and cholesterol at 20 years of age, then have this test every 5 years.  You may need to have your cholesterol levels checked more often if:  Your lipid or cholesterol levels are  high.  You are older than 50 years of age.  You are at high risk for heart disease.     Cancer screening  Lung Cancer  Lung cancer screening is recommended for adults 55-80 years old who are at high risk for lung cancer because of a history of smoking.  A yearly low-dose CT scan of the lungs is recommended for people who:  Currently smoke.  Have quit within the past 15 years.  Have at least a 30-pack-year history of smoking. A pack year is smoking an average of one pack of cigarettes a day for 1 year.  Yearly screening should continue until it has been 15 years since you quit.  Yearly screening should stop if you develop a health problem that would prevent you from having lung cancer treatment.     Breast Cancer  Practice breast self-awareness. This means understanding how your breasts normally appear and feel.  It also means doing regular breast self-exams. Let your health care provider know about any changes, no matter how small.  If you are in your 20s or 30s, you should have a clinical breast exam (CBE) by a health care provider every 1-3 years as part of a regular health exam.  If you are 40 or older, have a CBE every year. Also consider having a breast X-ray (mammogram) every year.  If you have a family history of breast cancer, talk to your health care provider about genetic screening.  If you are at high risk for breast cancer, talk to your health care provider about having an MRI and a mammogram every year.  Breast cancer gene (BRCA) assessment is recommended for women who have family members with BRCA-related cancers. BRCA-related cancers include:  Breast.  Ovarian.  Tubal.  Peritoneal cancers.  Results of the assessment will determine the need for genetic counseling and BRCA1 and BRCA2 testing.     Cervical Cancer  Your health care provider may recommend that you be screened regularly for cancer of the pelvic organs (ovaries, uterus, and vagina). This screening involves a pelvic examination, including  checking for microscopic changes to the surface of your cervix (Pap test). You may be encouraged to have this screening done every 3 years, beginning at age 21.  For women ages 30-65, health care providers may recommend pelvic exams and Pap testing every 3 years, or they may recommend the Pap and pelvic exam, combined with testing for human papilloma virus (HPV), every 5 years. Some types of HPV increase your risk of cervical cancer. Testing for HPV may also be done on women of any age with unclear Pap test results.  Other health care providers may not recommend any screening for nonpregnant women who are considered low risk for pelvic cancer and who do not have symptoms. Ask your health care provider if a screening pelvic exam is right for you.  If you have had past treatment for cervical cancer or a condition that could lead to cancer, you need Pap tests and screening for cancer for at least 20 years after your treatment. If Pap tests have been discontinued, your risk factors (such as having a new sexual partner) need to be reassessed to determine if screening should resume. Some women have medical problems that increase the chance of getting cervical cancer. In these cases, your health care provider may recommend more frequent screening and Pap tests.     Colorectal Cancer  This type of cancer can be detected and often prevented.  Routine colorectal cancer screening usually begins at 50 years of age and continues through 75 years of age.  Your health care provider may recommend screening at an earlier age if you have risk factors for colon cancer.  Your health care provider may also recommend using home test kits to check for hidden blood in the stool.  A small camera at the end of a tube can be used to examine your colon directly (sigmoidoscopy or colonoscopy). This is done to check for the earliest forms of colorectal cancer.  Routine screening usually begins at age 50.  Direct examination of the colon should  be repeated every 5-10 years through 75 years of age. However, you may need to be screened more often if early forms of precancerous polyps or small growths are found.     Skin Cancer  Check your skin from head to toe regularly.  Tell your health care provider about any new moles or changes in moles, especially if there is a change in a mole's shape or color.  Also tell your health care provider if you have a mole that is larger than the size of a pencil eraser.  Always use sunscreen. Apply sunscreen liberally and repeatedly throughout the day.  Protect yourself by wearing long sleeves, pants, a wide-brimmed hat, and sunglasses whenever you are outside.     Heart disease, diabetes, and high blood pressure  High blood pressure causes heart disease and increases the risk of stroke. High blood pressure is more likely to develop in:  People who have blood pressure in the high end of the normal range (130-139/85-89 mm Hg).  People who are overweight or obese.  People who are .  If you are 18-39 years of age, have your blood pressure checked every 3-5 years. If you are 40 years of age or older, have your blood pressure checked every year. You should have your blood pressure measured twice--once when you are at a hospital or clinic, and once when you are not at a hospital or clinic. Record the average of the two measurements. To check your blood pressure when you are not at a hospital or clinic, you can use:  An automated blood pressure machine at a pharmacy.  A home blood pressure monitor.  If you are between 55 years and 79 years old, ask your health care provider if you should take aspirin to prevent strokes.  Have regular diabetes screenings. This involves taking a blood sample to check your fasting blood sugar level.  If you are at a normal weight and have a low risk for diabetes, have this test once every three years after 45 years of age.  If you are overweight and have a high risk for diabetes,  consider being tested at a younger age or more often.  Preventing infection  Hepatitis B  If you have a higher risk for hepatitis B, you should be screened for this virus. You are considered at high risk for hepatitis B if:  You were born in a country where hepatitis B is common. Ask your health care provider which countries are considered high risk.  Your parents were born in a high-risk country, and you have not been immunized against hepatitis B (hepatitis B vaccine).  You have HIV or AIDS.  You use needles to inject street drugs.  You live with someone who has hepatitis B.  You have had sex with someone who has hepatitis B.  You get hemodialysis treatment.  You take certain medicines for conditions, including cancer, organ transplantation, and autoimmune conditions.     Hepatitis C  Blood testing is recommended for:  Everyone born from 1945 through 1965.  Anyone with known risk factors for hepatitis C.     Sexually transmitted infections (STIs)  You should be screened for sexually transmitted infections (STIs) including gonorrhea and chlamydia if:  You are sexually active and are younger than 24 years of age.  You are older than 24 years of age and your health care provider tells you that you are at risk for this type of infection.  Your sexual activity has changed since you were last screened and you are at an increased risk for chlamydia or gonorrhea. Ask your health care provider if you are at risk.  If you do not have HIV, but are at risk, it may be recommended that you take a prescription medicine daily to prevent HIV infection. This is called pre-exposure prophylaxis (PrEP). You are considered at risk if:  You are sexually active and do not regularly use condoms or know the HIV status of your partner(s).  You take drugs by injection.  You are sexually active with a partner who has HIV.     Talk with your health care provider about whether you are at high risk of being infected with HIV. If you choose to  begin PrEP, you should first be tested for HIV. You should then be tested every 3 months for as long as you are taking PrEP.  Pregnancy  If you are premenopausal and you may become pregnant, ask your health care provider about preconception counseling.  If you may become pregnant, take 400 to 800 micrograms (mcg) of folic acid every day.  If you want to prevent pregnancy, talk to your health care provider about birth control (contraception).  Osteoporosis and menopause  Osteoporosis is a disease in which the bones lose minerals and strength with aging. This can result in serious bone fractures. Your risk for osteoporosis can be identified using a bone density scan.  If you are 65 years of age or older, or if you are at risk for osteoporosis and fractures, ask your health care provider if you should be screened.  Ask your health care provider whether you should take a calcium or vitamin D supplement to lower your risk for osteoporosis.  Menopause may have certain physical symptoms and risks.  Hormone replacement therapy may reduce some of these symptoms and risks.  Talk to your health care provider about whether hormone replacement therapy is right for you.  Follow these instructions at home:  Schedule regular health, dental, and eye exams.  Stay current with your immunizations.  Do not use any tobacco products including cigarettes, chewing tobacco, or electronic cigarettes.  If you are pregnant, do not drink alcohol.  If you are breastfeeding, limit how much and how often you drink alcohol.  Limit alcohol intake to no more than 1 drink per day for nonpregnant women. One drink equals 12 ounces of beer, 5 ounces of wine, or 1½ ounces of hard liquor.  Do not use street drugs.  Do not share needles.  Ask your health care provider for help if you need support or information about quitting drugs.  Tell your health care provider if you often feel depressed.  Tell your health care provider if you have ever been abused or do  not feel safe at home.  This information is not intended to replace advice given to you by your health care provider. Make sure you discuss any questions you have with your health care provider.  Document Released: 07/02/2012 Document Revised: 05/25/2017 Document Reviewed: 09/20/2016  [employer] Interactive Patient Education © 2018 [employer]

## 2024-12-30 NOTE — ASSESSMENT & PLAN NOTE
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  She will discuss breast cancer history and early mammogram    Plan:  Patient to establish with OB/GYN  Monitor axillary lymph node and report if there are any changes prior to her annual wellness exam in June

## 2024-12-30 NOTE — ASSESSMENT & PLAN NOTE
Home blood pressures at or below goal  Blood pressure today also below goal  No change in treatment regimen at this time  Patient to check blood pressures occasionally and will return if they are consistently >140s over 90s  Patient to follow-up in 6 months for annual wellness visit

## 2024-12-30 NOTE — ASSESSMENT & PLAN NOTE
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

## 2025-06-16 ENCOUNTER — LAB (OUTPATIENT)
Dept: FAMILY MEDICINE CLINIC | Facility: CLINIC | Age: 40
End: 2025-06-16
Payer: COMMERCIAL

## 2025-06-16 DIAGNOSIS — Z00.00 ANNUAL PHYSICAL EXAM: ICD-10-CM

## 2025-06-17 LAB
ALBUMIN SERPL-MCNC: 4.3 G/DL (ref 3.9–4.9)
ALP SERPL-CCNC: 81 IU/L (ref 44–121)
ALT SERPL-CCNC: 10 IU/L (ref 0–32)
AST SERPL-CCNC: 14 IU/L (ref 0–40)
BASOPHILS # BLD AUTO: 0 X10E3/UL (ref 0–0.2)
BASOPHILS NFR BLD AUTO: 1 %
BILIRUB SERPL-MCNC: 0.3 MG/DL (ref 0–1.2)
BUN SERPL-MCNC: 10 MG/DL (ref 6–20)
BUN/CREAT SERPL: 15 (ref 9–23)
CALCIUM SERPL-MCNC: 9.1 MG/DL (ref 8.7–10.2)
CHLORIDE SERPL-SCNC: 103 MMOL/L (ref 96–106)
CHOLEST SERPL-MCNC: 172 MG/DL (ref 100–199)
CO2 SERPL-SCNC: 20 MMOL/L (ref 20–29)
CREAT SERPL-MCNC: 0.68 MG/DL (ref 0.57–1)
EGFRCR SERPLBLD CKD-EPI 2021: 114 ML/MIN/1.73
EOSINOPHIL # BLD AUTO: 0.1 X10E3/UL (ref 0–0.4)
EOSINOPHIL NFR BLD AUTO: 1 %
ERYTHROCYTE [DISTWIDTH] IN BLOOD BY AUTOMATED COUNT: 12.2 % (ref 11.7–15.4)
GLOBULIN SER CALC-MCNC: 2.5 G/DL (ref 1.5–4.5)
GLUCOSE SERPL-MCNC: 80 MG/DL (ref 70–99)
HBA1C MFR BLD: 5.3 % (ref 4.8–5.6)
HCT VFR BLD AUTO: 40.8 % (ref 34–46.6)
HDLC SERPL-MCNC: 51 MG/DL
HGB BLD-MCNC: 13.2 G/DL (ref 11.1–15.9)
IMM GRANULOCYTES # BLD AUTO: 0 X10E3/UL (ref 0–0.1)
IMM GRANULOCYTES NFR BLD AUTO: 0 %
LDLC SERPL CALC-MCNC: 108 MG/DL (ref 0–99)
LYMPHOCYTES # BLD AUTO: 2.1 X10E3/UL (ref 0.7–3.1)
LYMPHOCYTES NFR BLD AUTO: 24 %
MCH RBC QN AUTO: 30.5 PG (ref 26.6–33)
MCHC RBC AUTO-ENTMCNC: 32.4 G/DL (ref 31.5–35.7)
MCV RBC AUTO: 94 FL (ref 79–97)
MONOCYTES # BLD AUTO: 0.7 X10E3/UL (ref 0.1–0.9)
MONOCYTES NFR BLD AUTO: 8 %
NEUTROPHILS # BLD AUTO: 5.8 X10E3/UL (ref 1.4–7)
NEUTROPHILS NFR BLD AUTO: 66 %
PLATELET # BLD AUTO: 265 X10E3/UL (ref 150–450)
POTASSIUM SERPL-SCNC: 4 MMOL/L (ref 3.5–5.2)
PROT SERPL-MCNC: 6.8 G/DL (ref 6–8.5)
RBC # BLD AUTO: 4.33 X10E6/UL (ref 3.77–5.28)
SODIUM SERPL-SCNC: 140 MMOL/L (ref 134–144)
TRIGL SERPL-MCNC: 69 MG/DL (ref 0–149)
TSH SERPL DL<=0.005 MIU/L-ACNC: 3.02 UIU/ML (ref 0.45–4.5)
VLDLC SERPL CALC-MCNC: 13 MG/DL (ref 5–40)
WBC # BLD AUTO: 8.7 X10E3/UL (ref 3.4–10.8)

## 2025-06-23 ENCOUNTER — OFFICE VISIT (OUTPATIENT)
Dept: FAMILY MEDICINE CLINIC | Facility: CLINIC | Age: 40
End: 2025-06-23
Payer: COMMERCIAL

## 2025-06-23 VITALS
OXYGEN SATURATION: 100 % | DIASTOLIC BLOOD PRESSURE: 84 MMHG | WEIGHT: 198.1 LBS | HEIGHT: 64 IN | TEMPERATURE: 98.6 F | BODY MASS INDEX: 33.82 KG/M2 | HEART RATE: 67 BPM | SYSTOLIC BLOOD PRESSURE: 122 MMHG | RESPIRATION RATE: 16 BRPM

## 2025-06-23 DIAGNOSIS — R03.0 ELEVATED BLOOD-PRESSURE READING WITHOUT DIAGNOSIS OF HYPERTENSION: ICD-10-CM

## 2025-06-23 DIAGNOSIS — R07.89 CHEST DISCOMFORT: ICD-10-CM

## 2025-06-23 DIAGNOSIS — F41.8 SITUATIONAL ANXIETY: Primary | ICD-10-CM

## 2025-06-23 DIAGNOSIS — J30.2 SEASONAL ALLERGIES: ICD-10-CM

## 2025-06-23 PROCEDURE — 93000 ELECTROCARDIOGRAM COMPLETE: CPT

## 2025-06-23 PROCEDURE — 99395 PREV VISIT EST AGE 18-39: CPT

## 2025-06-23 RX ORDER — HYDROXYZINE HYDROCHLORIDE 25 MG/1
25 TABLET, FILM COATED ORAL 3 TIMES DAILY PRN
Qty: 90 TABLET | Refills: 1 | Status: SHIPPED | OUTPATIENT
Start: 2025-06-23

## 2025-06-23 NOTE — ASSESSMENT & PLAN NOTE
Orders:    hydrOXYzine (ATARAX) 25 MG tablet; Take 1 tablet by mouth 3 (Three) Times a Day As Needed for Itching.

## 2025-06-23 NOTE — PATIENT INSTRUCTIONS
Health Maintenance, Female  Adopting a healthy lifestyle and getting preventive care can go a long way to promote health and wellness. Talk with your health care provider about what schedule of regular examinations is right for you. This is a good chance for you to check in with your provider about disease prevention and staying healthy.  In between checkups, there are plenty of things you can do on your own. Experts have done a lot of research about which lifestyle changes and preventive measures are most likely to keep you healthy. Ask your health care provider for more information.  Weight and diet  Eat a healthy diet  Be sure to include plenty of vegetables, fruits, low-fat dairy products, and lean protein.  Do not eat a lot of foods high in solid fats, added sugars, or salt.  Get regular exercise. This is one of the most important things you can do for your health.  Most adults should exercise for at least 150 minutes each week. The exercise should increase your heart rate and make you sweat (moderate-intensity exercise).  Most adults should also do strengthening exercises at least twice a week. This is in addition to the moderate-intensity exercise.     Maintain a healthy weight  Body mass index (BMI) is a measurement that can be used to identify possible weight problems. It estimates body fat based on height and weight. Your health care provider can help determine your BMI and help you achieve or maintain a healthy weight.  For females 20 years of age and older:  A BMI below 18.5 is considered underweight.  A BMI of 18.5 to 24.9 is normal.  A BMI of 25 to 29.9 is considered overweight.  A BMI of 30 and above is considered obese.     Watch levels of cholesterol and blood lipids  You should start having your blood tested for lipids and cholesterol at 20 years of age, then have this test every 5 years.  You may need to have your cholesterol levels checked more often if:  Your lipid or cholesterol levels are  high.  You are older than 50 years of age.  You are at high risk for heart disease.     Cancer screening  Lung Cancer  Lung cancer screening is recommended for adults 55-80 years old who are at high risk for lung cancer because of a history of smoking.  A yearly low-dose CT scan of the lungs is recommended for people who:  Currently smoke.  Have quit within the past 15 years.  Have at least a 30-pack-year history of smoking. A pack year is smoking an average of one pack of cigarettes a day for 1 year.  Yearly screening should continue until it has been 15 years since you quit.  Yearly screening should stop if you develop a health problem that would prevent you from having lung cancer treatment.     Breast Cancer  Practice breast self-awareness. This means understanding how your breasts normally appear and feel.  It also means doing regular breast self-exams. Let your health care provider know about any changes, no matter how small.  If you are in your 20s or 30s, you should have a clinical breast exam (CBE) by a health care provider every 1-3 years as part of a regular health exam.  If you are 40 or older, have a CBE every year. Also consider having a breast X-ray (mammogram) every year.  If you have a family history of breast cancer, talk to your health care provider about genetic screening.  If you are at high risk for breast cancer, talk to your health care provider about having an MRI and a mammogram every year.  Breast cancer gene (BRCA) assessment is recommended for women who have family members with BRCA-related cancers. BRCA-related cancers include:  Breast.  Ovarian.  Tubal.  Peritoneal cancers.  Results of the assessment will determine the need for genetic counseling and BRCA1 and BRCA2 testing.     Cervical Cancer  Your health care provider may recommend that you be screened regularly for cancer of the pelvic organs (ovaries, uterus, and vagina). This screening involves a pelvic examination, including  checking for microscopic changes to the surface of your cervix (Pap test). You may be encouraged to have this screening done every 3 years, beginning at age 21.  For women ages 30-65, health care providers may recommend pelvic exams and Pap testing every 3 years, or they may recommend the Pap and pelvic exam, combined with testing for human papilloma virus (HPV), every 5 years. Some types of HPV increase your risk of cervical cancer. Testing for HPV may also be done on women of any age with unclear Pap test results.  Other health care providers may not recommend any screening for nonpregnant women who are considered low risk for pelvic cancer and who do not have symptoms. Ask your health care provider if a screening pelvic exam is right for you.  If you have had past treatment for cervical cancer or a condition that could lead to cancer, you need Pap tests and screening for cancer for at least 20 years after your treatment. If Pap tests have been discontinued, your risk factors (such as having a new sexual partner) need to be reassessed to determine if screening should resume. Some women have medical problems that increase the chance of getting cervical cancer. In these cases, your health care provider may recommend more frequent screening and Pap tests.     Colorectal Cancer  This type of cancer can be detected and often prevented.  Routine colorectal cancer screening usually begins at 50 years of age and continues through 75 years of age.  Your health care provider may recommend screening at an earlier age if you have risk factors for colon cancer.  Your health care provider may also recommend using home test kits to check for hidden blood in the stool.  A small camera at the end of a tube can be used to examine your colon directly (sigmoidoscopy or colonoscopy). This is done to check for the earliest forms of colorectal cancer.  Routine screening usually begins at age 50.  Direct examination of the colon should  be repeated every 5-10 years through 75 years of age. However, you may need to be screened more often if early forms of precancerous polyps or small growths are found.     Skin Cancer  Check your skin from head to toe regularly.  Tell your health care provider about any new moles or changes in moles, especially if there is a change in a mole's shape or color.  Also tell your health care provider if you have a mole that is larger than the size of a pencil eraser.  Always use sunscreen. Apply sunscreen liberally and repeatedly throughout the day.  Protect yourself by wearing long sleeves, pants, a wide-brimmed hat, and sunglasses whenever you are outside.     Heart disease, diabetes, and high blood pressure  High blood pressure causes heart disease and increases the risk of stroke. High blood pressure is more likely to develop in:  People who have blood pressure in the high end of the normal range (130-139/85-89 mm Hg).  People who are overweight or obese.  People who are .  If you are 18-39 years of age, have your blood pressure checked every 3-5 years. If you are 40 years of age or older, have your blood pressure checked every year. You should have your blood pressure measured twice--once when you are at a hospital or clinic, and once when you are not at a hospital or clinic. Record the average of the two measurements. To check your blood pressure when you are not at a hospital or clinic, you can use:  An automated blood pressure machine at a pharmacy.  A home blood pressure monitor.  If you are between 55 years and 79 years old, ask your health care provider if you should take aspirin to prevent strokes.  Have regular diabetes screenings. This involves taking a blood sample to check your fasting blood sugar level.  If you are at a normal weight and have a low risk for diabetes, have this test once every three years after 45 years of age.  If you are overweight and have a high risk for diabetes,  consider being tested at a younger age or more often.  Preventing infection  Hepatitis B  If you have a higher risk for hepatitis B, you should be screened for this virus. You are considered at high risk for hepatitis B if:  You were born in a country where hepatitis B is common. Ask your health care provider which countries are considered high risk.  Your parents were born in a high-risk country, and you have not been immunized against hepatitis B (hepatitis B vaccine).  You have HIV or AIDS.  You use needles to inject street drugs.  You live with someone who has hepatitis B.  You have had sex with someone who has hepatitis B.  You get hemodialysis treatment.  You take certain medicines for conditions, including cancer, organ transplantation, and autoimmune conditions.     Hepatitis C  Blood testing is recommended for:  Everyone born from 1945 through 1965.  Anyone with known risk factors for hepatitis C.     Sexually transmitted infections (STIs)  You should be screened for sexually transmitted infections (STIs) including gonorrhea and chlamydia if:  You are sexually active and are younger than 24 years of age.  You are older than 24 years of age and your health care provider tells you that you are at risk for this type of infection.  Your sexual activity has changed since you were last screened and you are at an increased risk for chlamydia or gonorrhea. Ask your health care provider if you are at risk.  If you do not have HIV, but are at risk, it may be recommended that you take a prescription medicine daily to prevent HIV infection. This is called pre-exposure prophylaxis (PrEP). You are considered at risk if:  You are sexually active and do not regularly use condoms or know the HIV status of your partner(s).  You take drugs by injection.  You are sexually active with a partner who has HIV.     Talk with your health care provider about whether you are at high risk of being infected with HIV. If you choose to  begin PrEP, you should first be tested for HIV. You should then be tested every 3 months for as long as you are taking PrEP.  Pregnancy  If you are premenopausal and you may become pregnant, ask your health care provider about preconception counseling.  If you may become pregnant, take 400 to 800 micrograms (mcg) of folic acid every day.  If you want to prevent pregnancy, talk to your health care provider about birth control (contraception).  Osteoporosis and menopause  Osteoporosis is a disease in which the bones lose minerals and strength with aging. This can result in serious bone fractures. Your risk for osteoporosis can be identified using a bone density scan.  If you are 65 years of age or older, or if you are at risk for osteoporosis and fractures, ask your health care provider if you should be screened.  Ask your health care provider whether you should take a calcium or vitamin D supplement to lower your risk for osteoporosis.  Menopause may have certain physical symptoms and risks.  Hormone replacement therapy may reduce some of these symptoms and risks.  Talk to your health care provider about whether hormone replacement therapy is right for you.  Follow these instructions at home:  Schedule regular health, dental, and eye exams.  Stay current with your immunizations.  Do not use any tobacco products including cigarettes, chewing tobacco, or electronic cigarettes.  If you are pregnant, do not drink alcohol.  If you are breastfeeding, limit how much and how often you drink alcohol.  Limit alcohol intake to no more than 1 drink per day for nonpregnant women. One drink equals 12 ounces of beer, 5 ounces of wine, or 1½ ounces of hard liquor.  Do not use street drugs.  Do not share needles.  Ask your health care provider for help if you need support or information about quitting drugs.  Tell your health care provider if you often feel depressed.  Tell your health care provider if you have ever been abused or do  not feel safe at home.  This information is not intended to replace advice given to you by your health care provider. Make sure you discuss any questions you have with your health care provider.  Document Released: 07/02/2012 Document Revised: 05/25/2017 Document Reviewed: 09/20/2016  [employer] Interactive Patient Education © 2018 [employer]

## 2025-06-23 NOTE — PROGRESS NOTES
Office Visit      Date:  2025   Patient Name: Jf Reyes  : 1985   MRN: [id number]     Chief Complaint   Patient presents with    Annual Exam       History of Present Illness: Jf Reyes is a 40 y.o. female who is here today for annual wellness. She has also had several stressors related to family and loss of pet.     History of Present Illness  The patient is a 40-year-old female who presents for an annual wellness visit.    She has been experiencing significant stress, which she attributes to the recent loss of her sister in 2025, the euthanasia of her dog, and the disappearance of her brother. She also mentions the passing of her best friend's father. She has been managing her stress with the help of a mental health counselor and therapist. She reports feeling anxious and paranoid, particularly about potential heart disease, due to her elevated cholesterol levels. She describes experiencing palpitations and flutters when highly anxious. She recalls a near-fainting episode prior to starting mental health treatment. Her anxiety has worsened over the past few months, and she occasionally experiences racing heart rates. She finds therapy beneficial and does not endorse any suicidal ideation or self-harm thoughts. She has been using melatonin as needed for sleep and previously used CBD gummies, which she found helpful but discontinued due to backorder. She is currently taking Zyrtec and Singulair.    She has been monitoring her blood pressure, which she reports as normal. She has been maintaining a healthy diet, avoiding overeating or undereating, and tracking her calorie intake. She plans to incorporate regular exercise into her routine. She has noticed a decrease in her LDL cholesterol levels by 10 points. She has been cooking more at home and has reduced her intake of premade foods, which she believes were contributing to her elevated cholesterol levels. She has also reduced her sodium  intake.    She reports no abdominal pain or ankle swelling. 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 She experiences pain when holding certain objects but is not overly concerned about it today.     She has been managing her allergies with Zyrtec and Singulair, and reports that her asthma is well-controlled as long as her allergies are managed. She experiences itchy eyes and skin, and uses Benadryl for relief.    She has been using a new deodorant and razor, which she reports have improved the condition of her armpits.      Subjective      Past Medical History:   Diagnosis Date    Allergic     Asthma        Past Surgical History:   Procedure Laterality Date    TUBAL ABDOMINAL LIGATION         Family History   Problem Relation Age of Onset    Diabetes Mother     Mental illness Mother         Depression, Borderline Personality Disorder    Cancer Father     Hyperlipidemia Father         recently found out    Cancer Maternal Grandmother     Thyroid disease Sister         Thyroid Cancer-surgical removal and treatment       Social History     Socioeconomic History    Marital status: Single   Tobacco Use    Smoking status: Never     Passive exposure: Never    Smokeless tobacco: Never   Vaping Use    Vaping status: Never Used   Substance and Sexual Activity    Alcohol use: Yes     Comment: Average 1-6 drinks a month    Drug use: Never    Sexual activity: Yes     Partners: Male     Birth control/protection: Tubal ligation     Comment: Boyfriend of 4+ years       Current Outpatient Medications   Medication Instructions    albuterol sulfate  (90 Base) MCG/ACT inhaler 2 puffs, Inhalation, Every 4 Hours PRN    fluticasone (FLONASE) 50 MCG/ACT nasal spray No dose, route, or frequency recorded.    hydrOXYzine (ATARAX) 25 mg, Oral, 3 Times Daily PRN    mometasone-formoterol (Dulera) 200-5 MCG/ACT inhaler 1 puff, Inhalation, Daily    montelukast  "(SINGULAIR) 10 mg, Oral, Every Night at Bedtime       Allergies   Allergen Reactions    R[employer]-Derived Products Anaphylaxis     Airway swelling    Ibuprofen Swelling       Objective     Physical Exam  Vitals and nursing note reviewed.   Constitutional:       General: She is not in acute distress.  HENT:      Right Ear: A middle ear effusion is present.      Left Ear: There is impacted cerumen.      Nose: Congestion present.      Mouth/Throat:      Mouth: Mucous membranes are moist.   Eyes:      Pupils: Pupils are equal, round, and reactive to light.   Cardiovascular:      Rate and Rhythm: Normal rate and regular rhythm.   Pulmonary:      Effort: Pulmonary effort is normal.      Breath sounds: Normal breath sounds.   Musculoskeletal:         General: Normal range of motion.   Skin:     General: Skin is warm and dry.   Neurological:      Mental Status: She is alert and oriented to person, place, and time.   Psychiatric:         Attention and Perception: Attention normal.         Mood and Affect: Affect is tearful.         Speech: Speech normal.         Behavior: Behavior normal.         Thought Content: Thought content normal.      Comments: Situational anxiety r/t several stressors including the loss of her sister and other losses as well.        ECG 12 Lead    Date/Time: 6/23/2025 9:16 AM  Performed by: Deena Macario APRN    Authorized by: Deena Macario APRN  Comparison: not compared with previous ECG   Previous ECG: no previous ECG available  Rhythm: sinus rhythm  Rate: normal  BPM: 61  QRS axis: normal    Clinical impression: normal ECG           Vitals:    06/23/25 0809   BP: 122/84   BP Location: Left arm   Patient Position: Sitting   Cuff Size: Large Adult   Pulse: 67   Resp: 16   Temp: 98.6 °F (37 °C)   TempSrc: Oral   SpO2: 100%   Weight: 89.9 kg (198 lb 1.6 oz)   Height: 162.6 cm (64.02\")   PainSc: 0-No pain     Body mass index is 33.98 kg/m².     The ASCVD Risk score (Jose Luis ROBERSON, et al., " 2019) failed to calculate for the following reasons:    The 2019 ASCVD risk score is only valid for ages 40 to 79    CMP          6/16/2025    08:01   CMP   Glucose 80    BUN 10    Creatinine 0.68    EGFR 114    Sodium 140    Potassium 4.0    Chloride 103    Calcium 9.1    Total Protein 6.8    Albumin 4.3    Globulin 2.5    Total Bilirubin 0.3    Alkaline Phosphatase 81    AST (SGOT) 14    ALT (SGPT) 10    BUN/Creatinine Ratio 15      CBC w/diff          6/16/2025    08:01   CBC w/Diff   WBC 8.7    RBC 4.33    Hemoglobin 13.2    Hematocrit 40.8    MCV 94    MCH 30.5    MCHC 32.4    RDW 12.2    Platelets 265    Neutrophil Rel % 66    Lymphocyte Rel % 24    Monocyte Rel % 8    Eosinophil Rel % 1    Basophil Rel % 1      Lipid Panel          6/16/2025    08:01   Lipid Panel   Total Cholesterol 172    Triglycerides 69    HDL Cholesterol 51    VLDL Cholesterol 13    LDL Cholesterol  108      TSH          6/16/2025    08:01   TSH   TSH 3.020      Most Recent A1C          6/16/2025    08:01   HGBA1C Most Recent   Hemoglobin A1C 5.3        Assessment / Plan      Assessment & Plan  1. Anxiety.  - Anxiety is likely a response to recent stressors, including the loss of her sister and other personal challenges.  - Reports feeling anxious about cholesterol levels and potential heart issues.  - Hydroxyzine 25 mg prescribed to be taken 30-45 minutes before bedtime. Lexapro initiated at a low dose to manage anxiety, with the understanding that it may take 4-6 weeks to become effective.  - An EKG will be performed today to rule out any cardiac issues. If increased suicidal thoughts occur on Lexapro, contact the office immediately.    2. Elevated cholesterol.  - LDL cholesterol has decreased by 10 points, indicating improvement.  - Advised to continue current dietary modifications and consider incorporating regular exercise into the routine.  - Monitoring of cholesterol levels will continue.    3. Allergies.  - Currently taking  Zyrtec and Singulair for allergies and asthma.  - Hydroxyzine may interact with Zyrtec, so will try discontinuing Zyrtec while starting hydroxyzine.  - If allergies worsen without Zyrtec, will stop hydroxyzine and resume Zyrtec.    4. Health Maintenance.  - Tetanus shot will be administered at her next visit in 4 weeks, patient declined today due to stress levels   - Reports her last one was in 2005.  - General health maintenance including blood pressure and cholesterol monitoring.    Follow-up  The patient will follow up in 1 month to discuss medication efficacy, anxiety, and make medication adjustments if indicated.       Situational anxiety  Orders:    hydrOXYzine (ATARAX) 25 MG tablet; Take 1 tablet by mouth 3 (Three) Times a Day As Needed for Itching.    Seasonal allergies       Elevated blood-pressure reading without diagnosis of hypertension       Chest discomfort  Orders:    ECG 12 Lead       BMI Follow up:  BMI is >= 30 and <35. (Class 1 Obesity). The following options were offered after discussion;: exercise counseling/recommendations, nutrition counseling/recommendations, and information on healthy weight added to patient's after visit summary       Patient Education:   Reviewed medications, potential side effects and signs and symptoms to report.   Discussed risk vs benefits of treatment plan with patient including medications, labs, and imaging.    Patient education materials attached to AVS and reviewed with patient during office visit today.   Addressed questions and concerns during visit. Patient verbalized understanding and agrees with tx plan.   Instructed to call the office with any questions and report to ER with any life-threatening symptoms.    Return in about 4 weeks (around 7/21/2025) for Recheck.    BILL Bustos (Libby)  [facility]   [address] [place] [id number] [phone]    Patient or patient representative verbalized consent for the use of  Ambient Listening during the visit with  BILL Quintanilla for chart documentation. 6/23/2025  08:53 EDT    NOTE TO PATIENT: The 21st Century Cures Act makes medical notes like these available to patients in the interest of transparency. However, be advised this is a medical document. It is intended as peer to peer communication. It is written in medical language and may contain abbreviations or verbiage that are unfamiliar. It may appear blunt or direct. Medical documents are intended to carry relevant information, facts as evident, and the clinical opinion of the practitioner.      EMR Dragon/Transcription disclaimer: Much of this encounter note is an electronic transcription of spoken language to printed text. Electronic transcription of spoken language may permit erroneous, or at times, nonsensical words or phrases to be inadvertently transcribed. Although I have reviewed the note for such errors, some may still exist.

## 2025-07-08 DIAGNOSIS — J30.2 SEASONAL ALLERGIES: ICD-10-CM

## 2025-07-08 DIAGNOSIS — J45.20 MILD INTERMITTENT ASTHMA WITHOUT COMPLICATION: ICD-10-CM

## 2025-07-09 RX ORDER — MOMETASONE FUROATE AND FORMOTEROL FUMARATE DIHYDRATE 200; 5 UG/1; UG/1
1 AEROSOL RESPIRATORY (INHALATION) DAILY
Qty: 13 G | Refills: 2 | Status: SHIPPED | OUTPATIENT
Start: 2025-07-09

## 2025-07-09 NOTE — TELEPHONE ENCOUNTER
Rx Refill Note    Requested Prescriptions     Pending Prescriptions Disp Refills    mometasone-formoterol (Dulera) 200-5 MCG/ACT inhaler [Pharmacy Med Name: DULERA 200 MCG-5 MCG INHALER] 13 each 0     Sig: INHALE 1 PUFF DAILY        Last office visit with prescribing clinician: 6/23/2025      Next office visit with prescribing clinician: 8/6/2025   Last labs:   Last refill: 06/24/2024   Pharmacy (be sure to add in Epic). correct

## 2025-07-22 DIAGNOSIS — J30.2 SEASONAL ALLERGIES: ICD-10-CM

## 2025-07-22 RX ORDER — MONTELUKAST SODIUM 10 MG/1
10 TABLET ORAL
Qty: 90 TABLET | Refills: 1 | Status: SHIPPED | OUTPATIENT
Start: 2025-07-22

## 2025-08-06 ENCOUNTER — OFFICE VISIT (OUTPATIENT)
Dept: FAMILY MEDICINE CLINIC | Facility: CLINIC | Age: 40
End: 2025-08-06
Payer: COMMERCIAL

## 2025-08-06 VITALS
DIASTOLIC BLOOD PRESSURE: 82 MMHG | BODY MASS INDEX: 33.39 KG/M2 | HEART RATE: 65 BPM | HEIGHT: 64 IN | OXYGEN SATURATION: 100 % | SYSTOLIC BLOOD PRESSURE: 130 MMHG | WEIGHT: 195.6 LBS

## 2025-08-06 DIAGNOSIS — F41.8 SITUATIONAL ANXIETY: ICD-10-CM

## 2025-08-06 DIAGNOSIS — Z23 NEED FOR VACCINATION: ICD-10-CM

## 2025-08-06 DIAGNOSIS — R03.0 ELEVATED BLOOD-PRESSURE READING WITHOUT DIAGNOSIS OF HYPERTENSION: ICD-10-CM

## 2025-08-06 DIAGNOSIS — Z00.00 ROUTINE GENERAL MEDICAL EXAMINATION AT A HEALTH CARE FACILITY: Primary | ICD-10-CM

## 2025-08-06 RX ORDER — PROPRANOLOL HYDROCHLORIDE 10 MG/1
10 TABLET ORAL 2 TIMES DAILY
Qty: 30 TABLET | Refills: 3 | Status: SHIPPED | OUTPATIENT
Start: 2025-08-06